# Patient Record
Sex: FEMALE | Race: WHITE | Employment: FULL TIME | URBAN - METROPOLITAN AREA
[De-identification: names, ages, dates, MRNs, and addresses within clinical notes are randomized per-mention and may not be internally consistent; named-entity substitution may affect disease eponyms.]

---

## 2020-02-28 ENCOUNTER — OP HISTORICAL/CONVERTED ENCOUNTER (OUTPATIENT)
Dept: OTHER | Age: 60
End: 2020-02-28

## 2020-06-04 ENCOUNTER — OP HISTORICAL/CONVERTED ENCOUNTER (OUTPATIENT)
Dept: OTHER | Age: 60
End: 2020-06-04

## 2020-09-11 ENCOUNTER — OP HISTORICAL/CONVERTED ENCOUNTER (OUTPATIENT)
Dept: OTHER | Age: 60
End: 2020-09-11

## 2020-10-01 ENCOUNTER — OP HISTORICAL/CONVERTED ENCOUNTER (OUTPATIENT)
Dept: OTHER | Age: 60
End: 2020-10-01

## 2020-10-01 ENCOUNTER — HOSPITAL ENCOUNTER (EMERGENCY)
Age: 60
Discharge: HOME | End: 2020-10-01
Attending: EMERGENCY MEDICINE
Payer: OTHER MISCELLANEOUS

## 2020-10-01 VITALS
WEIGHT: 213 LBS | HEART RATE: 69 BPM | DIASTOLIC BLOOD PRESSURE: 84 MMHG | SYSTOLIC BLOOD PRESSURE: 118 MMHG | BODY MASS INDEX: 36.37 KG/M2 | RESPIRATION RATE: 18 BRPM | OXYGEN SATURATION: 95 % | TEMPERATURE: 97.9 F | HEIGHT: 64 IN

## 2020-10-01 DIAGNOSIS — Z20.9 INFECTIOUS DISEASE EXPOSURE: Primary | ICD-10-CM

## 2020-10-01 LAB
HIV1 P24 AG SERPL QL IA: NONREACTIVE
HIV1+2 AB SERPL QL IA: NONREACTIVE

## 2020-10-01 PROCEDURE — 80074 ACUTE HEPATITIS PANEL: CPT

## 2020-10-01 PROCEDURE — 99282 EMERGENCY DEPT VISIT SF MDM: CPT

## 2020-10-01 PROCEDURE — 87389 HIV-1 AG W/HIV-1&-2 AB AG IA: CPT

## 2020-10-01 PROCEDURE — 36415 COLL VENOUS BLD VENIPUNCTURE: CPT

## 2020-10-01 NOTE — ED PROVIDER NOTES
EMERGENCY DEPARTMENT HISTORY AND PHYSICAL EXAM      Date: 10/1/2020  Patient Name: Butch Cruz    History of Presenting Illness     Chief Complaint   Patient presents with    Body fluid exposure       History Provided By: Patient    HPI: Butch Cruz, 60 y.o. female with a past medical history significant No significant past medical history presents to the ED with cc of body fluid exposure. 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 Patient conveys that she washed her eye out immediately. Eyes any history of infectious disease or HIV. She is conveying that she does not feel like this exposure was significant Bowsher. She specifically denies fever, chills, nausea, vomiting, chest pain, shortness of breath, rash, diarrhea, headache, night sweats, weight loss. There are no other complaints, changes, or physical findings at this time. PCP: Karissa Rojas MD        Past History     Past Medical History:  Past Medical History:   Diagnosis Date    Depression     High cholesterol     HTN (hypertension)        Past Surgical History:  Past Surgical History:   Procedure Laterality Date    HX  SECTION      HX ROTATOR CUFF REPAIR Right        Family History:  History reviewed. No pertinent family history. Social History:  Social History     Tobacco Use    Smoking status: Never Smoker    Smokeless tobacco: Never Used   Substance Use Topics    Alcohol use: Not Currently    Drug use: Never       Allergies:  No Known Allergies      Review of Systems     Review of Systems   Constitutional: Negative. Negative for activity change, appetite change, chills, fatigue, fever and unexpected weight change. HENT: Negative. Negative for congestion, hearing loss, rhinorrhea, sneezing and voice change. Eyes: Negative. Negative for pain and visual disturbance. Respiratory: Negative.   Negative for apnea, cough, choking, chest tightness and shortness of breath. Cardiovascular: Negative. Negative for chest pain and palpitations. Gastrointestinal: Negative. Negative for abdominal distention, abdominal pain, blood in stool, diarrhea, nausea and vomiting. Genitourinary: Negative. Negative for difficulty urinating, flank pain, frequency and urgency. No discharge   Musculoskeletal: Negative. Negative for arthralgias, back pain, myalgias and neck stiffness. Skin: Negative. Negative for color change and rash. Allergic/Immunologic: Negative for immunocompromised state. Neurological: Negative. Negative for dizziness, seizures, syncope, speech difficulty, weakness, numbness and headaches. Hematological: Negative for adenopathy. Psychiatric/Behavioral: Negative. Negative for agitation, behavioral problems, dysphoric mood and suicidal ideas. The patient is not nervous/anxious. Physical Exam     Physical Exam  Vitals signs and nursing note reviewed. Constitutional:       General: She is not in acute distress. Appearance: She is well-developed. HENT:      Head: Normocephalic and atraumatic. Nose: Nose normal.      Mouth/Throat:      Mouth: Mucous membranes are moist.      Pharynx: Oropharynx is clear. No oropharyngeal exudate. Eyes:      General:         Right eye: No discharge. Left eye: No discharge. Conjunctiva/sclera: Conjunctivae normal.      Pupils: Pupils are equal, round, and reactive to light. Neck:      Musculoskeletal: Normal range of motion and neck supple. Cardiovascular:      Rate and Rhythm: Normal rate and regular rhythm. Chest Wall: PMI is not displaced. No thrill. Heart sounds: Normal heart sounds. No murmur. No friction rub. No gallop. Pulmonary:      Effort: Pulmonary effort is normal. No respiratory distress. Breath sounds: Normal breath sounds. No wheezing or rales. Chest:      Chest wall: No tenderness. Abdominal:      General: Bowel sounds are normal. There is no distension. Palpations: Abdomen is soft. There is no mass. Tenderness: There is no abdominal tenderness. There is no guarding or rebound. Musculoskeletal: Normal range of motion. Lymphadenopathy:      Cervical: No cervical adenopathy. Skin:     General: Skin is warm and dry. Capillary Refill: Capillary refill takes less than 2 seconds. Findings: No erythema or rash. Neurological:      Mental Status: She is alert and oriented to person, place, and time. Cranial Nerves: No cranial nerve deficit. Coordination: Coordination normal.   Psychiatric:         Mood and Affect: Mood normal.         Behavior: Behavior normal.         Diagnostic Study Results     Labs -   No results found for this or any previous visit (from the past 12 hour(s)). Radiologic Studies -   [unfilled]  CT Results  (Last 48 hours)    None        CXR Results  (Last 48 hours)    None          Medical Decision Making and ED Course   I am the first provider for this patient. I reviewed the vital signs, available nursing notes, past medical history, past surgical history, family history and social history. Vital Signs-Reviewed the patient's vital signs. Patient Vitals for the past 12 hrs:   Temp Pulse Resp BP SpO2   10/01/20 0655 97.9 °F (36.6 °C) 69 18 118/84 95 %       Records Reviewed: Nursing Notes and Old Medical Records    The patient presents with body fluid exposure with a differential diagnosis of double HIV hepatitis or other infectious disease inoculation. Provider Notes (Medical Decision Making):     MDM  Number of Diagnoses or Management Options  Diagnosis management comments: Patient has body fluid exposure. Will send hepatitis A, B, and C panel as well as HIV 1 and 2P 24 antigen and antibody screen. It is deferring the use of prophylactic antivirals at this point time.        Amount and/or Complexity of Data Reviewed  Clinical lab tests: ordered  Tests in the medicine section of CPT®: ordered  Discussion of test results with the performing providers: no  Decide to obtain previous medical records or to obtain history from someone other than the patient: yes  Obtain history from someone other than the patient: no  Review and summarize past medical records: yes (Exposing patient)  Discuss the patient with other providers: yes    Risk of Complications, Morbidity, and/or Mortality  Presenting problems: moderate  Diagnostic procedures: moderate  Management options: moderate           ED Course:   Initial assessment performed. The patients presenting problems have been discussed, and they are in agreement with the care plan formulated and outlined with them. I have encouraged them to ask questions as they arise throughout their visit. Procedures       Emily Farr MD  Procedures         Disposition       Discharged  Remove if not discharged  DISCHARGE PLAN:  1. There are no discharge medications for this patient. 2.   Follow-up Information     Follow up With Specialties Details Why Contact Info    Shahzad Blackburn MD Family Medicine Call in 2 days As needed, If symptoms worsen [address] [facility]  [phone]          3. Return to ED if worse     Diagnosis     Clinical Impression:   1. Infectious disease exposure        Attestations:    Emily Farr MD    Please note that this dictation was completed with [employer], the computer voice recognition software. Quite often unanticipated grammatical, syntax, homophones, and other interpretive errors are inadvertently transcribed by the computer software. Please disregard these errors. Please excuse any errors that have escaped final proofreading. Thank you.

## 2020-10-04 LAB
HAV AB SER QL IA: NEGATIVE
HAV IGM SERPL QL IA: NEGATIVE
HBV CORE AB SERPL QL IA: NEGATIVE
HBV CORE IGM SERPL QL IA: NEGATIVE
HBV SURFACE AB SER QL: REACTIVE INDEX VALUE
HBV SURFACE AG SERPL QL IA: NEGATIVE
HCV AB S/CO SERPL IA: <0.1 S/CO RATIO

## 2020-10-05 LAB — HCV AB SERPL QL IA: NORMAL

## 2021-09-28 ENCOUNTER — HOSPITAL ENCOUNTER (OUTPATIENT)
Dept: PHYSICAL THERAPY | Age: 61
Discharge: HOME | End: 2021-09-28
Payer: COMMERCIAL

## 2021-09-28 PROCEDURE — 97161 PT EVAL LOW COMPLEX 20 MIN: CPT

## 2021-09-28 PROCEDURE — 97110 THERAPEUTIC EXERCISES: CPT

## 2021-09-28 NOTE — PROGRESS NOTES
PT INITIAL EVALUATION NOTE 2-15    Patient Name: Andrés Bach  Date:2021  : 1960  [x]  Patient  Verified  Payor: MEDICAL [employer] / Plan: [facility] MEDICAL [place] [address] / Product Type: Commerical /    In time:1420  Out time:1505  Total Treatment Time (min): 45  Visit #: 1     Treatment Area: Low back pain [M54.5]    SUBJECTIVE  Pain Level (0-10 scale): 0/10  Any medication changes, allergies to medications, adverse drug reactions, diagnosis change, or new procedure performed?: [] No    [x] Yes (see summary sheet for update)  Subjective:     Pt reports she has been having back pain for 12-18 months. She notes it has significantly intensified over the last 3 months. 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 She reports she has developed intermittent symptoms into her right leg primarily on the lateral aspect and the pain stays above the knee. She reports she spends 12 hour shifts standing on concrete floors but that she really wants to be able to keep working through the pain. She reports burning sensations and pain in the back itself but notes that is primarily when she is lifting or pulling on a patient. She states she is hoping therapy will help her keep working. 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   Past Medical History:  Past Medical History:   Diagnosis Date    Depression  High cholesterol     HTN (hypertension)      Past Surgical History:  Past Surgical History:   Procedure Laterality Date    HX  SECTION      HX ROTATOR CUFF REPAIR Right      Current Medications:  No current outpatient medications       OBJECTIVE/EXAMINATION    OBJECTIVE    Posture:  Head forward rounded shoulders  Gait and Functional Mobility:  antalgic  Palpation: moderate palpatory tenderness over the lumbar paraspinals and distal lats  Sensation: WNL        Lumbar AROM:      Flexion             50 degrees      Extension            45 degrees                     R                    L  Side Bending   50 degrees  50 degrees    Rotation   43 degrees  55 degrees      Manual Muscle Testing  Hip Flexion                   4+/5                  5/5  Knee Extension           5/5                    5/5  Knee Flexion               5/5  5/5  Ankle PF  5/5  5/5  Ankle DF  5/5  5/5    Flexibility: good     Special Tests:   FABERS: neg   Forward Bend: neg    Slump: neg   H.S. SLR: neg     Piriformis Ext: neg   SI Compression/Distraction: neg      10 min Therapeutic Exercise:  [x] See flow sheet :   Rationale: increase ROM and increase strength to improve the patients ability to independently perform lumbar HEP      With   [] TE   [] TA   [] neuro   [] other: Patient Education: [x] Provided HEP for lumbar stretching and core stability   [] Progressed/Changed HEP based on:   [] positioning   [] body mechanics   [] transfers   [] heat/ice application    [] other:        Pain Level (0-10 scale) post treatment: 2/10      ASSESSMENT:    [x]  See Plan of [address] Griffiths PT DPT 2021

## 2021-09-28 NOTE — PROGRESS NOTES
[address]  [place], [place]  Ph: [phone]    Fax: [phone]    Plan of Care/Statement of Necessity for Physical Therapy Services  2-15    Patient name: Mckenna Salinas  : 1960  Provider#: [id number]  Referral source: Argenis Tan MD      Medical/Treatment Diagnosis: Low back pain [M54.5]     Prior Hospitalization: see medical history     Comorbidities: see medical history  Prior Level of Function: Independent with all ADL's; no use of AD  Medications: Verified on Patient Summary List    Start of Care: 2021      Onset Date: 2021       The Plan of Care and following information is based on the information from the initial evaluation. Assessment/ key information: Pt presents to outpatient physical therapy with worsening onset of low back pain. She has dealt with this issue prior for 12-18 months but notes it really began to worsen in the last 3 months. 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 Her current deficits include decreased ROM, decreased strength, impaired gait, and increased pain and tenderness to palpation. She will likely benefit from skilled physical therapy services in order to address these deficits so that she can return to at or near her PLOF.     Evaluation Complexity History LOW Complexity : Zero comorbidities / personal factors that will impact the outcome / POC; Examination LOW Complexity : 1-2 Standardized tests and measures addressing body structure, function, activity limitation and / or participation in recreation  ;Presentation LOW Complexity : Stable, uncomplicated  ;Clinical Decision Making TUG Score: 10 seconds  Overall Complexity Rating: LOW     Problem List: pain affecting function, decrease ROM, decrease strength, impaired gait/ balance and decrease activity tolerance   Treatment Plan may include any combination of the following: Therapeutic exercise, Therapeutic activities, Neuromuscular re-education, Physical agent/modality, Gait/balance training, Manual therapy and Patient education  Patient / Family readiness to learn indicated by: asking questions and interest  Persons(s) to be included in education: patient (P)  Barriers to Learning/Limitations: None  Patient Goal (s): to be able to work without pain  Patient Self Reported Health Status: fair  Rehabilitation Potential: good    Short Term Goals: To be accomplished in 6 treatments. 1)  Pt to be independent with HEP  2)  Pt to improve lumbar flexion by 5 degrees in order to demonstrate improved facet joint function and no pain with walking beyond 3/10    Long Term Goals: To be accomplished in 12  treatments. 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 Patient/ Caregiver education and instruction: activity modification and exercises    [x]  Plan of care has been reviewed with GWENDOLYN De Paz, PT DPT 2021     ________________________________________________________________________    I certify that the above Therapy Services are being furnished while the patient is under my care. I agree with the treatment plan and certify that this therapy is necessary.     Physician's Signature:____________________  Date:____________Time: _________    Patient name: Sunny Lopez  : 1960  Provider#: [id number]

## 2021-10-05 ENCOUNTER — HOSPITAL ENCOUNTER (OUTPATIENT)
Dept: PHYSICAL THERAPY | Age: 61
Discharge: HOME | End: 2021-10-05
Payer: COMMERCIAL

## 2021-10-05 PROCEDURE — 97014 ELECTRIC STIMULATION THERAPY: CPT

## 2021-10-05 PROCEDURE — 97110 THERAPEUTIC EXERCISES: CPT

## 2021-10-05 NOTE — PROGRESS NOTES
PT DAILY TREATMENT NOTE 2-15    Patient Name: Lowell Rojas  Date:10/5/2021  : 1960  [x]  Patient  Verified  Payor: MEDICAL [employer] [place] / Plan: [facility] MEDICAL [place] [address] [facility] / Product Type: Commerical /    In time: 8:00 AM  Out time:8:57 PM  Total Treatment Time (min):   Visit #:  2    Treatment Area: Low back pain [M54.5]    SUBJECTIVE  Pain Level (0-10 scale): 1/10  Any medication changes, allergies to medications, adverse drug reactions, diagnosis change, or new procedure performed?: [x] No    [] Yes (see summary sheet for update)  Subjective functional status/changes:   [] No changes reported    I did my exercises and it helped. This morning I have more stiffness.     OBJECTIVE      Modality rationale: decrease inflammation, decrease pain and increase tissue extensibility to improve the patients ability to improve lumbar flexion by 5 degrees in order to demonstrate improved facet joint function and no pain with walking beyond 3/10      Min Type Additional Details      15 [x] Estim: []Att   [x]Unatt    []TENS instruct                  [x]IFC  []Premod   []NMES                    []Other:  []w/US      [x]w/ heat  []w/ ice  Position: lumbar  Location:sitting       []  Traction: [] Cervical       []Lumbar                       [] Prone          []Supine                       []Intermittent   []Continuous Lbs:  [] before manual  [] after manual  [] w/ heat  [] Simultaneously performed with w/ Estim    []  Ultrasound: []Continuous   [] Pulsed                       at: []1MHz   []3MHz Location:  W/cm2:    [] Paraffin         Location:   []w/heat    []  Ice     []  Heat  []  Ice massage Position:  Location:    []  Laser  []  Other: Position:  Location:      []  Vasopneumatic Device Pressure:       [] lo [] med [] hi   [] w/ ice      Temperature:   [] Simultaneously performed with w/ Estim     [x] Skin assessment post-treatment:  [x]intact []redness- no adverse reaction    []redness - adverse reaction:       35 min Therapeutic Exercise:  [x] See flow sheet :   Rationale: increase ROM and increase strength to improve the patients ability to improve lumbar flexion by 5 degrees in order to demonstrate improved facet joint function and no pain with walking beyond 3/10            With   [] TE   [] TA   [] neuro   [] other: Patient Education: [x] Review HEP    [] Progressed/Changed HEP based on:   [] positioning   [] body mechanics   [] transfers   [] heat/ice application    [] other:      Other Objective/Functional Measures: Lumbar strengthening and stretching exercise  Followed by modalities to Low back     Pain Level (0-10 scale) post treatment: 0/10    ASSESSMENT/Changes in Function:   Patient tolerated treatment with lumbar exercises strengthening exercises. Patient will benefit from postural exercises of the lower and upper back to improve  excess pressure on facet joints. Patient will continue to benefit from skilled PT services to address ROM deficits, address strength deficits and analyze and address soft tissue restrictions to attain remaining goals. [x]  See Plan of Care  []  See progress note/recertification  []  See Discharge Summary         Progress towards goals / Updated goals:  Short Term Goals: To be accomplished in 6 treatments. 1)  Pt to be independent with HEP  2)  Pt to improve lumbar flexion by 5 degrees in order to demonstrate improved facet joint function and no pain with walking beyond 3/10     Long Term Goals: To be accomplished in 12  treatments.   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

## 2021-10-07 ENCOUNTER — APPOINTMENT (OUTPATIENT)
Dept: PHYSICAL THERAPY | Age: 61
End: 2021-10-07
Payer: COMMERCIAL

## 2021-10-11 ENCOUNTER — HOSPITAL ENCOUNTER (OUTPATIENT)
Dept: PHYSICAL THERAPY | Age: 61
Discharge: HOME | End: 2021-10-11
Payer: COMMERCIAL

## 2021-10-11 PROCEDURE — 97014 ELECTRIC STIMULATION THERAPY: CPT

## 2021-10-11 PROCEDURE — 97110 THERAPEUTIC EXERCISES: CPT

## 2021-10-11 NOTE — PROGRESS NOTES
PT DAILY TREATMENT NOTE 2-15    Patient Name: Lowell Rojas  Date:10/11/2021  : 1960  [x]  Patient  Verified  Payor: MEDICAL [facility] / Plan: [facility] MEDICAL [place] [address] [facility] / Product Type: Commerical /    In time: 2:48  Out time: 3:54  Total Treatment Time (min): 66  Visit #:  3    Treatment Area: Low back pain [M54.5]    SUBJECTIVE  Pain Level (0-10 scale): 10  Any medication changes, allergies to medications, adverse drug reactions, diagnosis change, or new procedure performed?: [x] No    [] Yes (see summary sheet for update)  Subjective functional status/changes:   [] No changes reported  Pt reports most of her pain today is along side of R leg    OBJECTIVE    Modality rationale: decrease pain and increase tissue extensibility to improve the patients ability to be able to perform AROM   Min Type Additional Details      10 [x] Estim: []Att   [x]Unatt    []TENS instruct                  [x]IFC  []Premod   []NMES                    []Other:  []w/US      [x]w/ heat  []w/ ice  Position: seated  Location: low back       []  Traction: [] Cervical       []Lumbar                       [] Prone          []Supine                       []Intermittent   []Continuous Lbs:  [] before manual  [] after manual  [] w/ heat  [] Simultaneously performed with w/ Estim    []  Ultrasound: []Continuous   [] Pulsed                       at: []1MHz   []3MHz Location:  W/cm2:    [] Paraffin         Location:   []w/heat    []  Ice     []  Heat  []  Ice massage Position:  Location:    []  Laser  []  Other: Position:  Location:      []  Vasopneumatic Device Pressure:       [] lo [] med [] hi   [] w/ ice      Temperature:   [] Simultaneously performed with w/ Estim     [x] Skin assessment post-treatment:  [x]intact [x]redness- no adverse reaction    []redness - adverse reaction:       56 min Therapeutic Exercise:  [x] See flow sheet :   Rationale: increase ROM and increase strength to improve the patients ability to improve functional mobility         With   [x] TE   [] TA   [] neuro   [] other: Patient Education: [x] Review HEP    [] Progressed/Changed HEP based on:   [] positioning   [] body mechanics   [] transfers   [] heat/ice application    [] other:      Pain Level (0-10 scale) post treatment: 2/10    ASSESSMENT/Changes in Function: Patient tolerated treatment fairly well. Pt noted increased tightness in R hip > L with stretches. Patient will continue to benefit from skilled PT services to address functional mobility deficits, address ROM deficits and address strength deficits to attain remaining goals. [x]  See Plan of Care  []  See progress note/recertification  []  See Discharge Summary         Progress towards goals / Updated goals:  Short Term Goals: To be accomplished in 6 treatments. 1)  Pt to be independent with HEP  2)  Pt to improve lumbar flexion by 5 degrees in order to demonstrate improved facet joint function and no pain with walking beyond 3/10     Long Term Goals: To be accomplished in 12  treatments.   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

## 2021-10-14 ENCOUNTER — APPOINTMENT (OUTPATIENT)
Dept: PHYSICAL THERAPY | Age: 61
End: 2021-10-14
Payer: COMMERCIAL

## 2021-10-21 ENCOUNTER — HOSPITAL ENCOUNTER (OUTPATIENT)
Dept: PHYSICAL THERAPY | Age: 61
Discharge: HOME | End: 2021-10-21
Payer: COMMERCIAL

## 2021-10-21 PROCEDURE — 97014 ELECTRIC STIMULATION THERAPY: CPT

## 2021-10-21 PROCEDURE — 97110 THERAPEUTIC EXERCISES: CPT

## 2021-10-21 NOTE — PROGRESS NOTES
PT DAILY TREATMENT NOTE 2-15    Patient Name: Zita Alonso  Date:10/21/2021  : 1960  [x]  Patient  Verified  Payor: [facility] / Plan: [facility] MEDICAL [address] / Product Type: Commerical /    In time: 3:04  Out time: 4:03  Total Treatment Time (min): 61  Visit #:  4    Treatment Area: Low back pain [M54.5]    SUBJECTIVE  Pain Level (0-10 scale): 2/10  Any medication changes, allergies to medications, adverse drug reactions, diagnosis change, or new procedure performed?: [x] No    [] Yes (see summary sheet for update)  Subjective functional status/changes:   [] No changes reported  Pt states she just has soreness in her hip, but present with increased limp    OBJECTIVE    Modality rationale: decrease pain and increase tissue extensibility to improve the patients ability to be able to perform AROM   Min Type Additional Details      10 [x] Estim: []Att   [x]Unatt    []TENS instruct                  [x]IFC  []Premod   []NMES                    []Other:  []w/US      [x]w/ heat  []w/ ice  Position: seated  Location: low back       []  Traction: [] Cervical       []Lumbar                       [] Prone          []Supine                       []Intermittent   []Continuous Lbs:  [] before manual  [] after manual  [] w/ heat  [] Simultaneously performed with w/ Estim    []  Ultrasound: []Continuous   [] Pulsed                       at: []1MHz   []3MHz Location:  W/cm2:    [] Paraffin         Location:   []w/heat    []  Ice     []  Heat  []  Ice massage Position:  Location:    []  Laser  []  Other: Position:  Location:      []  Vasopneumatic Device Pressure:       [] lo [] med [] hi   [] w/ ice      Temperature:   [] Simultaneously performed with w/ Estim     [x] Skin assessment post-treatment:  [x]intact [x]redness- no adverse reaction    []redness - adverse reaction:       49 min Therapeutic Exercise:  [x] See flow sheet :   Rationale: increase ROM and increase strength to improve the patients ability to improve functional mobility         With   [x] TE   [] TA   [] neuro   [] other: Patient Education: [x] Review HEP    [] Progressed/Changed HEP based on:   [] positioning   [] body mechanics   [] transfers   [] heat/ice application    [] other:      Pain Level (0-10 scale) post treatment: 2/10    ASSESSMENT/Changes in Function: Patient tolerated treatment fairly well. Post-tx pt stated her back was fine, but still c/o pain in her hip. Pt requires cues for all stretches/exercises for correct form. Patient will continue to benefit from skilled PT services to address functional mobility deficits, address ROM deficits and address strength deficits to attain remaining goals. [x]  See Plan of Care  []  See progress note/recertification  []  See Discharge Summary         Progress towards goals / Updated goals:  Short Term Goals: To be accomplished in 6 treatments. 1)  Pt to be independent with HEP  2)  Pt to improve lumbar flexion by 5 degrees in order to demonstrate improved facet joint function and no pain with walking beyond 3/10     Long Term Goals: To be accomplished in 12  treatments.   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

## 2021-10-27 ENCOUNTER — HOSPITAL ENCOUNTER (OUTPATIENT)
Dept: PHYSICAL THERAPY | Age: 61
Discharge: HOME | End: 2021-10-27
Payer: COMMERCIAL

## 2021-10-27 PROCEDURE — 97014 ELECTRIC STIMULATION THERAPY: CPT

## 2021-10-27 PROCEDURE — 97110 THERAPEUTIC EXERCISES: CPT

## 2021-10-27 NOTE — PROGRESS NOTES
PT DAILY TREATMENT NOTE 2-15    Patient Name: Omi Gates  Date:10/27/2021  : 1960  [x]  Patient  Verified  Payor: [id number] / Plan: [facility] MEDICAL [address] / Product Type: Commerical /    In time:301 pm   Out time:400 pm  Total Treatment Time (min): 61  Visit #:  5    Treatment Area: Low back pain [M54.5]    SUBJECTIVE  Pain Level (0-10 scale): 1-2/10  Any medication changes, allergies to medications, adverse drug reactions, diagnosis change, or new procedure performed?: [x] No    [] Yes (see summary sheet for update)  Subjective functional status/changes:   [] No changes reported  \"Pt reports some soreness in hip and only a little discomfort in her back. \"    OBJECTIVE      Modality rationale: decrease inflammation, decrease pain and increase tissue extensibility to improve the patients ability to increase back and hip motion    Min Type Additional Details      10 [x] Estim: []Att   [x]Unatt    []TENS instruct                  [x]IFC  []Premod   []NMES                    []Other:  []w/US      [x]w/ heat  []w/ ice  Position: seated  Location: R hip and LB       []  Traction: [] Cervical       []Lumbar                       [] Prone          []Supine                       []Intermittent   []Continuous Lbs:  [] before manual  [] after manual  [] w/ heat  [] Simultaneously performed with w/ Estim    []  Ultrasound: []Continuous   [] Pulsed                       at: []1MHz   []3MHz Location:  W/cm2:    [] Paraffin         Location:   []w/heat    []  Ice     []  Heat  []  Ice massage Position:  Location:    []  Laser  []  Other: Position:  Location:      []  Vasopneumatic Device Pressure:       [] lo [] med [] hi   [] w/ ice      Temperature:   [] Simultaneously performed with w/ Estim     [x] Skin assessment post-treatment:  [x]intact []redness- no adverse reaction    []redness - adverse reaction:       49 min Therapeutic Exercise:  [x] See flow sheet :   Rationale: increase ROM, increase strength and improve coordination to improve the patients ability to increase back and hip motion with decrease c/o. With   [] TE   [] TA   [] neuro   [] other: Patient Education: [x] Review HEP    [] Progressed/Changed HEP based on:   [] positioning   [] body mechanics   [] transfers   [] heat/ice application    [] other:           Pain Level (0-10 scale) post treatment: 0/10    ASSESSMENT/Changes in Function:   Patient tolerated treatment session with work on flex /ext stretching in low back with cat and camel ex pt very stiff and having trouble with transition between the two. Pt did get relief with estim and MHP. Pt given HEP handout for roberth pose and cat-camel to add to ex program.   Patient will continue to benefit from skilled PT services to modify and progress therapeutic interventions, address functional mobility deficits, address ROM deficits, address strength deficits and analyze and address soft tissue restrictions to attain remaining goals. [x]  See Plan of Care  []  See progress note/recertification  []  See Discharge Summary         Progress towards goals / Updated goals:  Short Term Goals: To be accomplished in 6 treatments. 1)  Pt to be independent with HEP  2)  Pt to improve lumbar flexion by 5 degrees in order to demonstrate improved facet joint function and no pain with walking beyond 3/10     Long Term Goals: To be accomplished in 12  treatments. 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 Lara Bloch, Rometta Brock 10/27/2021

## 2021-10-28 ENCOUNTER — HOSPITAL ENCOUNTER (OUTPATIENT)
Dept: PHYSICAL THERAPY | Age: 61
Discharge: HOME | End: 2021-10-28
Payer: COMMERCIAL

## 2021-10-28 PROCEDURE — 97014 ELECTRIC STIMULATION THERAPY: CPT

## 2021-10-28 PROCEDURE — 97530 THERAPEUTIC ACTIVITIES: CPT

## 2021-10-28 PROCEDURE — 97110 THERAPEUTIC EXERCISES: CPT

## 2021-10-28 NOTE — PROGRESS NOTES
[address] [facility], [address]  Ph: [phone]    Fax: [phone]    Progress Note    Name: Indigo Ortiz   : 1960   MD: Iggy Frank MD       Treatment Diagnosis: Low back pain [M54.5]  Start of Care: 2021    Visits from Start of Care: 6     Missed Visits: 1    Summary of Care / Assessment / Recommendations: The Plan of Care and following information is based on the information from the initial evaluation. Assessment/ key information: Pt presents to outpatient physical therapy with worsening onset of low back pain. She has dealt with this issue prior for 12-18 months but notes it really began to worsen in the last 3 months. 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 Patient continues to work long hours. However, pain has decreased and range of motion of the lumbar spine has increased in lateral flexion. She continues to have a deficits include decreased ROM, decreased strength, impaired gait, and increased pain and tenderness to palpation. She will likely benefit from continued skilled physical therapy services in order to address these deficits so that she can return to at or near her PLOF. Progress towards goals / Updated goals:  Short Term Goals: To be accomplished in 6 treatments. 1)  Pt to be independent with HEP - met   2)  Pt to improve lumbar flexion by 5 degrees in order to demonstrate improved facet joint function and no pain with walking beyond 3/10     Long Term Goals: To be accomplished in 12  treatments.   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 Retain this original for your records. If you are unable to process this request in 24 hours, please contact our office.        ____ I have read the above report and request that my patient continue therapy with the following changes/special instructions:  ____ I have read the above report and request that my patient be discharged from therapy    Physician's Signature:_________________ Date:___________Time:__________

## 2021-10-28 NOTE — PROGRESS NOTES
PT DAILY TREATMENT NOTE 2-15    Patient Name: Sunny Lopez  Date:10/28/2021  : 1960  [x]  Patient  Verified  Payor: [id number] / Plan: [facility] MEDICAL [address] / Product Type: Commerical /    In time:303 pm   Out time:400 pm   Total Treatment Time (min): 62  Visit #:  6    Treatment Area: Low back pain [M54.5]    SUBJECTIVE  Pain Level (0-10 scale): 0/10  Any medication changes, allergies to medications, adverse drug reactions, diagnosis change, or new procedure performed?: [x] No    [] Yes (see summary sheet for update)  Subjective functional status/changes:   [] No changes reported  \"Pt reports no pain just tired. \"    OBJECTIVE      Modality rationale: decrease inflammation, decrease pain and increase tissue extensibility to improve the patients ability to increase back motion    Min Type Additional Details      15 [x] Estim: []Att   [x]Unatt    []TENS instruct                  []IFC  [x]Premod   []NMES                    []Other:  []w/US      [x]w/ heat  []w/ ice  Position: seated   Location: bilat low back        []  Traction: [] Cervical       []Lumbar                       [] Prone          []Supine                       []Intermittent   []Continuous Lbs:  [] before manual  [] after manual  [] w/ heat  [] Simultaneously performed with w/ Estim    []  Ultrasound: []Continuous   [] Pulsed                       at: []1MHz   []3MHz Location:  W/cm2:    [] Paraffin         Location:   []w/heat    []  Ice     []  Heat  []  Ice massage Position:  Location:    []  Laser  []  Other: Position:  Location:      []  Vasopneumatic Device Pressure:       [] lo [] med [] hi   [] w/ ice      Temperature:   [] Simultaneously performed with w/ Estim     [x] Skin assessment post-treatment:  [x]intact []redness- no adverse reaction    []redness - adverse reaction:       15 min Therapeutic Exercise:  [x] See flow sheet :   Rationale: increase ROM, increase strength and improve coordination to improve the patients ability to increase back motion with decreased c/o.     27 min Therapeutic Activity:  [x]  See flow sheet :   Rationale: increase ROM, increase strength and improve coordination  to improve the patients ability to review pt progress with ROM , Strength and goals to advance services. With   [] TE   [] TA   [] neuro   [] other: Patient Education: [x] Review HEP    [] Progressed/Changed HEP based on:   [] positioning   [] body mechanics   [] transfers   [] heat/ice application    [] other:      Other Objective/Functional Measures: Posture: mild  Head forward rounded shoulders  Gait and Functional Mobility:  amb with slight lateral lean to R   Palpation: no tenderness  Sensation:  WNL                                                     Lumbar AROM:                                               Flexion                                              60 degrees                                               Extension                                         45 degrees                                                                                                      R                                 L  Side Bending                           30 degrees                  30 degrees                    Rotation                                   43 degrees                  55 degrees                       Manual Muscle Testing  Hip Flexion                   4+/5                  5/5  Knee Extension           5/5                    5/5  Knee Flexion               5/5                   5/5  Ankle PF                     5/5                   5/5  Ankle DF                     5/5                   5/5     Flexibility: good                Special Tests:              FABERS: neg              Forward Bend: neg                                         Slump: neg              H.S. SLR: neg                                                 Piriformis Ext: neg              SI Compression/Distraction: neg     Pain Level (0-10 scale) post treatment: 0/10    ASSESSMENT/Changes in Function:   Patient tolerated treatment *session with stretching and review of goals, ROM and MMT to show improvement and areas that are still in need of improvement. Discussed with pt and Physical therapist plan to continue qw. Patient will continue to benefit from skilled PT services to modify and progress therapeutic interventions, address functional mobility deficits, address ROM deficits, address strength deficits and analyze and address soft tissue restrictions to attain remaining goals. [x]  See Plan of Care  []  See progress note/recertification  []  See Discharge Summary         Progress towards goals / Updated goals:  Short Term Goals: To be accomplished in 6 treatments. 1)  Pt to be independent with HEP - met   2)  Pt to improve lumbar flexion by 5 degrees in order to demonstrate improved facet joint function and no pain with walking beyond 3/10     Long Term Goals: To be accomplished in 12  treatments. 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

## 2021-11-03 ENCOUNTER — HOSPITAL ENCOUNTER (OUTPATIENT)
Dept: PHYSICAL THERAPY | Age: 61
Discharge: HOME | End: 2021-11-03
Payer: COMMERCIAL

## 2021-11-03 PROCEDURE — 97110 THERAPEUTIC EXERCISES: CPT

## 2021-11-03 PROCEDURE — 97014 ELECTRIC STIMULATION THERAPY: CPT

## 2021-11-03 NOTE — PROGRESS NOTES
PT DAILY TREATMENT NOTE 2-15    Patient Name: Enrike Or  Date:11/3/2021  : 1960  [x]  Patient  Verified  Payor: [employer] [employer] / Plan: [facility] MEDICAL [address] [facility] / Product Type: Commerical /    In time: 2:58  Out time: 3:59  Total Treatment Time (min): 61  Visit #:  7    Treatment Area: Low back pain [M54.5]    SUBJECTIVE  Pain Level (0-10 scale): 1/10  Any medication changes, allergies to medications, adverse drug reactions, diagnosis change, or new procedure performed?: [x] No    [] Yes (see summary sheet for update)  Subjective functional status/changes:   [] No changes reported  \"You get used to it. \"    OBJECTIVE    Modality rationale: decrease pain and increase tissue extensibility to improve the patients ability to be able to perform AROM   Min Type Additional Details      10 [x] Estim: []Att   [x]Unatt    []TENS instruct                  [x]IFC  []Premod   []NMES                    []Other:  []w/US      [x]w/ heat  []w/ ice  Position: seated  Location: low back       []  Traction: [] Cervical       []Lumbar                       [] Prone          []Supine                       []Intermittent   []Continuous Lbs:  [] before manual  [] after manual  [] w/ heat  [] Simultaneously performed with w/ Estim    []  Ultrasound: []Continuous   [] Pulsed                       at: []1MHz   []3MHz Location:  W/cm2:    [] Paraffin         Location:   []w/heat    []  Ice     []  Heat  []  Ice massage Position:  Location:    []  Laser  []  Other: Position:  Location:      []  Vasopneumatic Device Pressure:       [] lo [] med [] hi   [] w/ ice      Temperature:   [] Simultaneously performed with w/ Estim     [x] Skin assessment post-treatment:  [x]intact [x]redness- no adverse reaction    []redness - adverse reaction:       51 min Therapeutic Exercise:  [x] See flow sheet :   Rationale: increase ROM and increase strength to improve the patients ability to improve functional mobility         With   [x] TE   [] TA   [] neuro   [] other: Patient Education: [x] Review HEP    [] Progressed/Changed HEP based on:   [] positioning   [] body mechanics   [] transfers   [] heat/ice application    [] other:      Pain Level (0-10 scale) post treatment: 0/10    ASSESSMENT/Changes in Function: Patient tolerated treatment fairly well. No c/o increased pain. Patient will continue to benefit from skilled PT services to address functional mobility deficits, address ROM deficits and address strength deficits to attain remaining goals. [x]  See Plan of Care  []  See progress note/recertification  []  See Discharge Summary         Progress towards goals / Updated goals:  Short Term Goals: To be accomplished in 6 treatments. 1)  Pt to be independent with HEP - met   2)  Pt to improve lumbar flexion by 5 degrees in order to demonstrate improved facet joint function and no pain with walking beyond 3/10     Long Term Goals: To be accomplished in 12  treatments.   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

## 2021-11-11 ENCOUNTER — HOSPITAL ENCOUNTER (OUTPATIENT)
Dept: PHYSICAL THERAPY | Age: 61
Discharge: HOME | End: 2021-11-11
Payer: COMMERCIAL

## 2021-11-11 PROCEDURE — 97014 ELECTRIC STIMULATION THERAPY: CPT

## 2021-11-11 PROCEDURE — 97110 THERAPEUTIC EXERCISES: CPT

## 2021-11-11 NOTE — PROGRESS NOTES
PT DAILY TREATMENT NOTE 2-15    Patient Name: Andrés Bach  Date:2021  : 1960  [x]  Patient  Verified  Payor: [id number] / Plan: [facility] MEDICAL [address] / Product Type: Commerical /    In time:3:00 PM  Out time:3:45 PM  Total Treatment Time (min): 45  Visit #:  8    Treatment Area: Low back pain [M54.5]    SUBJECTIVE  Pain Level (0-10 scale): 0/10  Any medication changes, allergies to medications, adverse drug reactions, diagnosis change, or new procedure performed?: [x] No    [] Yes (see summary sheet for update)  Subjective functional status/changes:   [] No changes reported    I worked long shift last night. I have mostly soreness in my hips. 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    Min Type Additional Details      15 [x] Estim: []Att   [x]Unatt    []TENS instruct                  [x]IFC  []Premod   []NMES                    []Other:  []w/US      [x]w/ heat  []w/ ice  Position:sitting  Location: Low back       []  Traction: [] Cervical       []Lumbar                       [] Prone          []Supine                       []Intermittent   []Continuous Lbs:  [] before manual  [] after manual  [] w/ heat  [] Simultaneously performed with w/ Estim    []  Ultrasound: []Continuous   [] Pulsed                       at: []1MHz   []3MHz Location:  W/cm2:    [] Paraffin         Location:   []w/heat    []  Ice     []  Heat  []  Ice massage Position:  Location:    []  Laser  []  Other: Position:  Location:      []  Vasopneumatic Device Pressure:       [] lo [] med [] hi   [] w/ ice      Temperature:   [] Simultaneously performed with w/ Estim     [x] Skin assessment post-treatment:  [x]intact []redness- no adverse reaction    []redness - adverse reaction:       30 min Therapeutic Exercise:  [x] See flow sheet :   Rationale: increase ROM and increase strength to improve the patients ability to demonstrate proper technique 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 -              With   [] TE   [] TA   [] neuro   [] other: Patient Education: [x] Review HEP    [] Progressed/Changed HEP based on:   [] positioning   [] body mechanics   [] transfers   [] heat/ice application    [] other:          Pain Level (0-10 scale) post treatment: 0/10    ASSESSMENT/Changes in Function:   Patient tolerated treatment for strengthening Low back. Patient is not having low back pain. Complaint more of  hip soreness. Patient may need  to let her back recover from working excessive long hours. Patient will continue to benefit from skilled PT services to address ROM deficits and address strength deficits to attain remaining goals. [x]  See Plan of Care  []  See progress note/recertification  []  See Discharge Summary           Progress towards goals / Updated goals:  Short Term Goals: To be accomplished in 6 treatments. 1)  Pt to be independent with HEP - MET   2)  Pt to improve lumbar flexion by 5 degrees in order to demonstrate improved facet joint function and no pain with walking beyond 3/10. MET     Long Term Goals: To be accomplished in 12  treatments.   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

## 2021-11-17 ENCOUNTER — APPOINTMENT (OUTPATIENT)
Dept: PHYSICAL THERAPY | Age: 61
End: 2021-11-17
Payer: COMMERCIAL

## 2021-11-22 ENCOUNTER — HOSPITAL ENCOUNTER (OUTPATIENT)
Dept: PHYSICAL THERAPY | Age: 61
Discharge: HOME | End: 2021-11-22
Payer: COMMERCIAL

## 2021-11-22 PROCEDURE — 97530 THERAPEUTIC ACTIVITIES: CPT

## 2021-11-22 PROCEDURE — 97110 THERAPEUTIC EXERCISES: CPT

## 2021-11-22 NOTE — PROGRESS NOTES
PT DAILY TREATMENT NOTE 2-15    Patient Name: Blanca Carrillo  Date:2021  : 1960  [x]  Patient  Verified  Payor: MEDICAL [employer] / Plan: [facility] [address] / Product Type: Commerical /    In time:300 pm   Out time:336 pm   Total Treatment Time (min): 36  Visit #:  9    Treatment Area: Low back pain [M54.5]    SUBJECTIVE  Pain Level (0-10 scale): 0/10  Any medication changes, allergies to medications, adverse drug reactions, diagnosis change, or new procedure performed?: [x] No    [] Yes (see summary sheet for update)  Subjective functional status/changes:   [] No changes reported  \"Pt reports doing better the ex has really helped. \"    OBJECTIVE      12 min Therapeutic Exercise:  [x] See flow sheet :   Rationale: increase ROM, increase strength and improve coordination to improve the patients ability to increase functional back motion     24 min Therapeutic Activity:  [x]  See flow sheet :   Rationale: increase ROM, increase strength and improve coordination  to improve the patients ability to preform ex, met goals and increase motion and strength for back motion                With   [] TE   [] TA   [] neuro   [] other: Patient Education: [x] Review HEP    [] Progressed/Changed HEP based on:   [] positioning   [] body mechanics   [] transfers   [] heat/ice application    [] other:      Other Objective/Functional Measures: Posture: slight Head forward rounded shoulders  Gait and Functional Mobility:  amb with equal WS and trunk swing  Palpation: no tenderness  Sensation: WNL     [place] [employer]:                                               Flexion                                              70 degrees                                               Extension                                         45 degrees                                                                                                      R                                 L  Side Bending                           40 degrees                  45 degrees                    Rotation                                   43 degrees                  55 degrees                       Manual Muscle Testing  Hip Flexion                   5/5                  5/5  Knee Extension           5/5                    5/5  Knee Flexion               5/5                   5/5  Ankle PF                     5/5                   5/5  Ankle DF                     5/5                   5/5     Flexibility: good                Special Tests:              HQJDKF: neg              SZOGGMF Bend: neg                                         Slump: neg              H.S. SLR: neg                                                 Piriformis Ext: neg              GH Compression/Distraction: neg             Pain Level (0-10 scale) post treatment: 0/10    ASSESSMENT/Changes in Function:   Patient tolerated treatment session with some ex and review of goals, ROM and MMT. Pt exhibts equal and normal motion with all activities. Pt notes compliance with HEP, given updated copy for continued use and red thera band to progress ex. Pt. Discussed with DPT discharge from therapy services a this time. Patient did benefit from skilled PT services to modify and progress therapeutic interventions, address functional mobility deficits, address ROM deficits, address strength deficits and analyze and address soft tissue restrictions to attain remaining goals.      []  See Plan of Care  []  See progress note/recertification  [x]  See Discharge Summary         Progress towards goals / Updated goals:  Short Term Goals: To be accomplished in 6 treatments. 1)  Pt to be independent with HEP - MET   2)  Pt to improve lumbar flexion by 5 degrees in order to demonstrate improved facet joint function and no pain with walking beyond 3/10. MET     Long Term Goals: To be accomplished in 12  treatments.   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

## 2022-02-07 ENCOUNTER — APPOINTMENT (OUTPATIENT)
Dept: GENERAL RADIOLOGY | Age: 62
End: 2022-02-07
Attending: NURSE PRACTITIONER
Payer: OTHER MISCELLANEOUS

## 2022-02-07 ENCOUNTER — NURSE TRIAGE (OUTPATIENT)
Dept: OTHER | Facility: CLINIC | Age: 62
End: 2022-02-07

## 2022-02-07 ENCOUNTER — HOSPITAL ENCOUNTER (EMERGENCY)
Age: 62
Discharge: HOME | End: 2022-02-07
Payer: OTHER MISCELLANEOUS

## 2022-02-07 VITALS
BODY MASS INDEX: 35 KG/M2 | RESPIRATION RATE: 20 BRPM | DIASTOLIC BLOOD PRESSURE: 80 MMHG | SYSTOLIC BLOOD PRESSURE: 156 MMHG | TEMPERATURE: 97.9 F | HEIGHT: 64 IN | HEART RATE: 66 BPM | WEIGHT: 205 LBS | OXYGEN SATURATION: 96 %

## 2022-02-07 DIAGNOSIS — Z02.6 ENCOUNTER RELATED TO WORKER'S COMPENSATION CLAIM: ICD-10-CM
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
## 2022-02-07 DIAGNOSIS — M25.531 RIGHT WRIST PAIN: ICD-10-CM

## 2022-02-07 DIAGNOSIS — M25.561 PAIN IN BOTH KNEES, UNSPECIFIED CHRONICITY: ICD-10-CM

## 2022-02-07 DIAGNOSIS — M25.511 RIGHT SHOULDER PAIN, UNSPECIFIED CHRONICITY: ICD-10-CM

## 2022-02-07 DIAGNOSIS — M25.562 PAIN IN BOTH KNEES, UNSPECIFIED CHRONICITY: ICD-10-CM

## 2022-02-07 PROCEDURE — 73110 X-RAY EXAM OF WRIST: CPT

## 2022-02-07 PROCEDURE — 73562 X-RAY EXAM OF KNEE 3: CPT

## 2022-02-07 PROCEDURE — 74011250637 HC RX REV CODE- 250/637: Performed by: NURSE PRACTITIONER

## 2022-02-07 PROCEDURE — 73030 X-RAY EXAM OF SHOULDER: CPT

## 2022-02-07 PROCEDURE — 99283 EMERGENCY DEPT VISIT LOW MDM: CPT

## 2022-02-07 PROCEDURE — 73080 X-RAY EXAM OF ELBOW: CPT

## 2022-02-07 RX ORDER — KETOROLAC TROMETHAMINE 10 MG/1
10 TABLET, FILM COATED ORAL ONCE
Status: COMPLETED | OUTPATIENT
Start: 2022-02-07 | End: 2022-02-07

## 2022-02-07 RX ORDER — KETOROLAC TROMETHAMINE 10 MG/1
10 TABLET, FILM COATED ORAL
Qty: 12 TABLET | Refills: 0 | Status: SHIPPED | OUTPATIENT
Start: 2022-02-07 | End: 2022-02-10

## 2022-02-07 RX ADMIN — KETOROLAC TROMETHAMINE 10 MG: 10 TABLET, FILM COATED ORAL at 22:25

## 2022-02-07 NOTE — Clinical Note
[facility] EMERGENCY DEPT  [address] [address]  [phone]    Work/School Note    Date: 2/7/2022    To Whom It May concern:      Fazal Dao was seen and treated today in the emergency room by the following provider(s):  Nurse Practitioner: Xena Talley NP. Fazal Dao is excused from work/school on 02/07/22. She is clear to return to work/school on 02/08/22. Adriana Whittkshire is unable to return and finish her shift tonight 2/7/2022 but may return 2/8/2022 as she is able and once she follows with [facility].     Sincerely,          Kerri Patel NP

## 2022-02-08 NOTE — ED PROVIDER NOTES
EMERGENCY DEPARTMENT HISTORY AND PHYSICAL EXAM      Date: 2022  Patient Name: Everette Kramer    History of Presenting Illness     Chief Complaint   Patient presents with    Fall       History Provided By: Patient    HPI: Everette Kramer, 57 y.o. female with a past medical history significant obesity and right rotator cuff surgery, hypertension, hypercholesterolemia presents to the ED with cc of bilateral knee pain, right wrist pain, and right shoulder pain status post ground-level fall. 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 She denies any head injury, any other pain or injury, no LOC, no dizziness or any other mechanism contributing to the fall other than the IV tubing. She was sent for evaluation by nursing supervisor. She is able to ambulate and bear weight and has full range of motion to all extremities with no evidence of deformity or acute edema or bruising. There are no other complaints, changes, or physical findings at this time. PCP: Deepthi Garcia MD    No current facility-administered medications on file prior to encounter. No current outpatient medications on file prior to encounter. Past History     Past Medical History:  Past Medical History:   Diagnosis Date    Depression     High cholesterol     HTN (hypertension)        Past Surgical History:  Past Surgical History:   Procedure Laterality Date    HX  SECTION      HX ROTATOR CUFF REPAIR Right        Family History:  History reviewed. No pertinent family history.     Social History:  Social History     Tobacco Use    Smoking status: Never Smoker    Smokeless tobacco: Never Used   Substance Use Topics    Alcohol use: Not Currently    Drug use: Never       Allergies:  No Known Allergies      Review of Systems     Review of Systems   Constitutional: Negative. Negative for appetite change, fatigue and fever. HENT: Negative. Negative for congestion and sore throat. Eyes: Negative. Negative for visual disturbance. Respiratory: Negative. Negative for cough, chest tightness and shortness of breath. Cardiovascular: Negative. Negative for chest pain and leg swelling. Gastrointestinal: Negative. Negative for abdominal pain, diarrhea, nausea and vomiting. Endocrine: Negative. Genitourinary: Negative. Negative for dysuria and flank pain. Musculoskeletal: Positive for arthralgias. Negative for back pain, gait problem, joint swelling, myalgias, neck pain and neck stiffness. Skin: Negative. Allergic/Immunologic: Negative. Neurological: Negative for dizziness, tremors, syncope, weakness, light-headedness, numbness and headaches. Hematological: Negative. Psychiatric/Behavioral: Negative. Negative for confusion. All other systems reviewed and are negative. Physical Exam     Physical Exam  Vitals and nursing note reviewed. Constitutional:       General: She is not in acute distress. Appearance: Normal appearance. She is obese. She is not ill-appearing or diaphoretic. HENT:      Head: Normocephalic and atraumatic. Right Ear: External ear normal.      Left Ear: External ear normal.      Nose: Nose normal.      Mouth/Throat:      Pharynx: Oropharynx is clear. Eyes:      General: No scleral icterus. Conjunctiva/sclera: Conjunctivae normal.      Pupils: Pupils are equal, round, and reactive to light. Cardiovascular:      Rate and Rhythm: Normal rate and regular rhythm. Pulses: Normal pulses. Heart sounds: Normal heart sounds. Pulmonary:      Effort: Pulmonary effort is normal.      Breath sounds: Normal breath sounds. Chest:      Chest wall: No tenderness.    Abdominal:      General: Bowel sounds are normal.      Palpations: Abdomen is soft. Tenderness: There is no abdominal tenderness. Musculoskeletal:      Right shoulder: Tenderness and bony tenderness present. No swelling, deformity or crepitus. Normal range of motion. Normal strength. Normal pulse. Left shoulder: Normal.      Right upper arm: Normal.      Left upper arm: Normal.      Right elbow: Normal.      Left elbow: Normal.      Right forearm: Normal.      Left forearm: Normal.      Right wrist: Tenderness present. No swelling, deformity, bony tenderness, snuff box tenderness or crepitus. Normal range of motion. Normal pulse. Left wrist: Normal.      Right hand: Normal.      Left hand: Normal.      Cervical back: Normal range of motion and neck supple. Right hip: Normal.      Left hip: Normal.      Right upper leg: Normal.      Left upper leg: Normal.      Right knee: Bony tenderness present. No swelling, effusion, erythema, ecchymosis or crepitus. Normal range of motion. Normal alignment and normal patellar mobility. Left knee: Bony tenderness present. No swelling, effusion, erythema, ecchymosis or crepitus. Normal range of motion. Normal alignment and normal patellar mobility. Right lower leg: Normal.      Left lower leg: Normal.      Right ankle: Normal.      Left ankle: Normal.   Skin:     General: Skin is warm and dry. Capillary Refill: Capillary refill takes less than 2 seconds. Findings: Rash present. No abrasion, bruising, erythema, signs of injury or wound. Rash is crusting. Neurological:      General: No focal deficit present. Mental Status: She is alert and oriented to person, place, and time. Cranial Nerves: Cranial nerves are intact. Sensory: Sensation is intact. No sensory deficit. Motor: Motor function is intact. No tremor, abnormal muscle tone or pronator drift. Coordination: Coordination is intact. Gait: Gait is intact.    Psychiatric:         Mood and Affect: Mood normal. Behavior: Behavior normal.         Lab and Diagnostic Study Results     Labs -   No results found for this or any previous visit (from the past 12 hour(s)). Radiologic Studies -   @lastxrresult@  CT Results  (Last 48 hours)    None        CXR Results  (Last 48 hours)    None            Medical Decision Making   - I am the first provider for this patient. - I reviewed the vital signs, available nursing notes, past medical history, past surgical history, family history and social history. - Initial assessment performed. The patients presenting problems have been discussed, and they are in agreement with the care plan formulated and outlined with them. I have encouraged them to ask questions as they arise throughout their visit. Vital Signs-Reviewed the patient's vital signs. Patient Vitals for the past 12 hrs:   Temp Pulse Resp BP SpO2   02/07/22 2146 97.9 °F (36.6 °C) 66 20 (!) 156/80 96 %       Records Reviewed: Nursing Notes, Old Medical Records and Previous Radiology Studies    The patient presents with level fall, bilateral knee pain, shoulder pain, right wrist pain with a differential diagnosis of contusion, fracture, dislocation, ligament injury, tendon injury, damage to previous orthopedic surgery      ED Course:   Patient was seen and evaluated. Full range of motion active, without evidence of acute pain. No dislocation, deformity, ecchymosis or contusion seen on initial exam.  Patient reexamined prior to disposition and no development of any additional symptoms, areas of pain or worsening of her initial complaints. Her pain improved with ice pack and 1 dose of oral Toradol. Imaging negative for any acute fracture, dislocation, effusion or abnormality. She was agreeable with following up with Workmen's Compensation for further evaluation and for recommendations for return to work.   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 She has understanding and knowledge of signs and symptoms to return for and will return to the emergency department if needed arise or worsening of symptoms prior to ability to follow-up with Workmen's Comp. if necessary. She is able to bear weight and ambulate with steady gait with no change in range of motion from initial examination       Provider Notes (Medical Decision Making):     MDM  Number of Diagnoses or Management Options  Encounter related to worker's compensation claim: new, needed workup  Fall, initial encounter: new, needed workup  Pain in both knees, unspecified chronicity: new, needed workup  Right shoulder pain, unspecified chronicity: new, needed workup  Right wrist pain: new, needed workup     Amount and/or Complexity of Data Reviewed  Tests in the radiology section of CPT®: ordered and reviewed  Discuss the patient with other providers: yes  Independent visualization of images, tracings, or specimens: yes    Risk of Complications, Morbidity, and/or Mortality  Presenting problems: low  Diagnostic procedures: minimal  Management options: minimal    Patient Progress  Patient progress: improved           Disposition   Disposition: Condition stable and improved  DC- Adult Discharges: All of the diagnostic tests were reviewed and questions answered. Diagnosis, care plan and treatment options were discussed. The patient understands the instructions and will follow up as directed. The patients results have been reviewed with them. They have been counseled regarding their diagnosis. The patient verbally convey understanding and agreement of the signs, symptoms, diagnosis, treatment and prognosis and additionally agrees to follow up as recommended with their PCP in 24 - 48 hours. They also agree with the care-plan and convey that all of their questions have been answered.   I have also put together some discharge instructions for them that include: 1) educational information regarding their diagnosis, 2) how to care for their diagnosis at home, as well a 3) list of reasons why they would want to return to the ED prior to their follow-up appointment, should their condition change. Discharged    DISCHARGE PLAN:  1. There are no discharge medications for this patient. 2.   Follow-up Information     Follow up With Specialties Details Why Contact Info    Ekaterina Corral MD Family Medicine Call  For PCP follow up or as directed by [address]  [phone]      Orthopedic provider or as directed by [facility] Compensation for follow up  Call       Follow up with primary care, urgent care, or this Emergency department   As needed, If symptoms worsen         3. Return to ED if worse   4. Discharge Medication List as of 2/7/2022 11:29 PM            Diagnosis     Clinical Impression:   1. Fall, initial encounter    2. Right shoulder pain, unspecified chronicity    3. Right wrist pain    4. Pain in both knees, unspecified chronicity    5. Encounter related to worker's compensation claim        Attestations:    Sandro Stiles NP    Please note that this dictation was completed with [employer], the [employer] voice recognition software. Quite often unanticipated grammatical, syntax, homophones, and other interpretive errors are inadvertently transcribed by the computer software. Please disregard these errors. Please excuse any errors that have escaped final proofreading. Thank you.

## 2022-02-08 NOTE — ED TRIAGE NOTES
Pt reports tripped over IV tubing, did not strike head pain to right shoulder elbow and bilateral knees.

## 2022-02-08 NOTE — TELEPHONE ENCOUNTER
Location of employment: [employer]    Location of injury (body part involved):   Right shoulder, right wrist and hand and both knees. Time of injury: 2055 2/7/2022    Last 4 of N: 9254    Subjective: Caller states \"I was turning to walk away from patient and my foot got caught up in the patient line, I tried to shake my leg out and I tripped about 10 feet and fell on both knees and hit draws and countertop, I hit my right shoulder and right palm and wrist.\"     Current Symptoms: Right shoulder pain and right wrist and right hand    Pain Severity: 4/10; tingling; constant    Temperature: patient denies n/a    What has been tried: Took tylenol and naproxen around 1845. LMP: NA Pregnant: NA    Recommended disposition: See PCP within 24 hours. Care advice provided, patient verbalizes understanding; denies any other questions or concerns; instructed to call back for any new or worsening symptoms. Patient proceeding to [facility] Emergency Department      Reason for Disposition   [1] High-risk adult (e.g., age > 61 years, osteoporosis, chronic steroid use) AND [2] still hurts   [1] High-risk adult (e.g., age > 61 years, osteoporosis, chronic steroid use) AND [2] still hurts    Answer Assessment - Initial Assessment Questions  1. MECHANISM: \"How did the injury happen? \"      I was turning to walk away from patient and my foot got caught up in the patient line, I tried to shake my leg out and I tripped about 10 feet and fell on both knees and hit draws and countertop, I hit my right shoulder and right palm and wrist.\"     2. ONSET: \"When did the injury happen? \" (Minutes or hours ago)       2055    3. APPEARANCE of INJURY: \"What does the injury look like? \"       Patient states shoulder looks normal    4. SEVERITY: \"Can you move the shoulder normally? \"       I can move it normally but it is painful    5. SIZE: For cuts, bruises, or swelling, ask: \"How large is it? \" (e.g., inches or centimeters;  entire joint)       Denies    6. PAIN: \"Is there pain? \" If Yes, ask: \"How bad is the pain? \"   (e.g., Scale 1-10; or mild, moderate, severe)      Yes 4/10     7. TETANUS: For any breaks in the skin, ask: \"When was the last tetanus booster? \"      N/a    8. OTHER SYMPTOMS: \"Do you have any other symptoms? \" (e.g., loss of sensation)      Tingling in shoulder and tingling in hand and wrist is a little numb. 9. PREGNANCY: \"Is there any chance you are pregnant? \" \"When was your last menstrual period? \"      n/a    Answer Assessment - Initial Assessment Questions  1. MECHANISM: \"How did the injury happen? \"      \"I was turning to walk away from patient and my foot got caught up in the patient line, I tried to shake my leg out and I tripped about 10 feet and fell on both knees and hit draws and countertop, I hit my right shoulder and right palm and wrist.\"     2. ONSET: \"When did the injury happen? \" (Minutes or hours ago)       2055    3. APPEARANCE of INJURY: \"What does the injury look like? \"       Looks normal    4. SEVERITY: \"Can you use the hand normally? \" \"Can you bend your fingers into a ball and then fully open them? \"      Yes can use normally    5. SIZE: For cuts, bruises, or swelling, ask: \"How large is it? \" (e.g., inches or centimeters;  entire hand or wrist)       Patient denies    6. PAIN: \"Is there pain? \" If Yes, ask: \"How bad is the pain? \"  (Scale 1-10; or mild, moderate, severe)      Yes 3/10    7. TETANUS: For any breaks in the skin, ask: \"When was the last tetanus booster? \"      N/a    8. OTHER SYMPTOMS: \"Do you have any other symptoms? \"       Numbness in wrist    9. PREGNANCY: \"Is there any chance you are pregnant? \" \"When was your last menstrual period? \"      n/a    Protocols used: SHOULDER INJURY-ADULT-, HAND AND WRIST INJURY-ADULT-

## 2022-08-24 NOTE — THERAPY DISCHARGE
[address]  [place], [place]  Ph: [phone]     Fax: [phone]    Discharge Summary 2-15    Patient name: Sulma Sen  : 1960  Provider#: [id number]  Referral source: Griffin Enriquez MD      Medical/Treatment Diagnosis: Low back pain [M54.5]     Prior Hospitalization: see medical history     Comorbidities: See Plan of Care  Prior Level of Function: See Plan of Care  Medications: Verified on Patient Summary List    Start of Care: 2021      Onset Date:   Visits from Start of Care: 9   Missed Visits: 2  Reporting Period : 2021 to 2021    Assessment / Summary of care:   Pt demonstrated good progress with therapy, was reporting no pain at last visit and had met all goals. Progress Toward Goals:  Short Term Goals: To be accomplished in 6 treatments. 1)  Pt to be independent with HEP - MET   2)  Pt to improve lumbar flexion by 5 degrees in order to demonstrate improved facet joint function and no pain with walking beyond 3/10. MET     Long Term Goals: To be accomplished in 12  treatments.   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

## 2022-09-28 ENCOUNTER — APPOINTMENT (OUTPATIENT)
Dept: CT IMAGING | Age: 62
End: 2022-09-28
Attending: EMERGENCY MEDICINE
Payer: COMMERCIAL

## 2022-09-28 ENCOUNTER — HOSPITAL ENCOUNTER (EMERGENCY)
Age: 62
Discharge: HOME | End: 2022-09-28
Attending: EMERGENCY MEDICINE
Payer: COMMERCIAL

## 2022-09-28 VITALS
BODY MASS INDEX: 35 KG/M2 | SYSTOLIC BLOOD PRESSURE: 118 MMHG | RESPIRATION RATE: 18 BRPM | WEIGHT: 205 LBS | DIASTOLIC BLOOD PRESSURE: 70 MMHG | HEART RATE: 70 BPM | OXYGEN SATURATION: 98 % | HEIGHT: 64 IN | TEMPERATURE: 98.3 F

## 2022-09-28 DIAGNOSIS — N12 PYELONEPHRITIS: Primary | ICD-10-CM

## 2022-09-28 LAB
ALBUMIN SERPL-MCNC: 3.1 G/DL (ref 3.5–5)
ALBUMIN/GLOB SERPL: 0.7 {RATIO} (ref 1.1–2.2)
ALP SERPL-CCNC: 174 U/L (ref 45–117)
ALT SERPL-CCNC: 27 U/L (ref 12–78)
ANION GAP SERPL CALC-SCNC: 11 MMOL/L (ref 5–15)
APPEARANCE UR: CLEAR
AST SERPL W P-5'-P-CCNC: 20 U/L (ref 15–37)
BACTERIA URNS QL MICRO: ABNORMAL /HPF
BASOPHILS # BLD: 0 K/UL (ref 0–0.1)
BASOPHILS NFR BLD: 0 % (ref 0–1)
BILIRUB SERPL-MCNC: 0.6 MG/DL (ref 0.2–1)
BILIRUB UR QL: NEGATIVE
BUN SERPL-MCNC: 21 MG/DL (ref 6–20)
BUN/CREAT SERPL: 16 (ref 12–20)
CA-I BLD-MCNC: 9.3 MG/DL (ref 8.5–10.1)
CHLORIDE SERPL-SCNC: 99 MMOL/L (ref 97–108)
CO2 SERPL-SCNC: 22 MMOL/L (ref 21–32)
COLOR UR: YELLOW
CREAT SERPL-MCNC: 1.28 MG/DL (ref 0.55–1.02)
DIFFERENTIAL METHOD BLD: ABNORMAL
EOSINOPHIL # BLD: 0 K/UL (ref 0–0.4)
EOSINOPHIL NFR BLD: 0 % (ref 0–7)
EPITH CASTS URNS QL MICRO: ABNORMAL /LPF
ERYTHROCYTE [DISTWIDTH] IN BLOOD BY AUTOMATED COUNT: 15 % (ref 11.5–14.5)
FLUAV RNA SPEC QL NAA+PROBE: NOT DETECTED
FLUBV RNA SPEC QL NAA+PROBE: NOT DETECTED
GLOBULIN SER CALC-MCNC: 4.4 G/DL (ref 2–4)
GLUCOSE SERPL-MCNC: 129 MG/DL (ref 65–100)
GLUCOSE UR STRIP.AUTO-MCNC: NEGATIVE MG/DL
HCT VFR BLD AUTO: 33.8 % (ref 35–47)
HGB BLD-MCNC: 11.3 G/DL (ref 11.5–16)
HGB UR QL STRIP: ABNORMAL
IMM GRANULOCYTES # BLD AUTO: 0 K/UL (ref 0–0.04)
IMM GRANULOCYTES NFR BLD AUTO: 0 % (ref 0–0.5)
KETONES UR QL STRIP.AUTO: 50 MG/DL
LACTATE SERPL-SCNC: 1 MMOL/L (ref 0.4–2)
LEUKOCYTE ESTERASE UR QL STRIP.AUTO: ABNORMAL
LYMPHOCYTES # BLD: 0.4 K/UL (ref 0.8–3.5)
LYMPHOCYTES NFR BLD: 5 % (ref 12–49)
MCH RBC QN AUTO: 27.2 PG (ref 26–34)
MCHC RBC AUTO-ENTMCNC: 33.4 G/DL (ref 30–36.5)
MCV RBC AUTO: 81.3 FL (ref 80–99)
MONOCYTES # BLD: 1.1 K/UL (ref 0–1)
MONOCYTES NFR BLD: 12 % (ref 5–13)
NEUTS SEG # BLD: 7.3 K/UL (ref 1.8–8)
NEUTS SEG NFR BLD: 83 % (ref 32–75)
NITRITE UR QL STRIP.AUTO: NEGATIVE
PH UR STRIP: 5 [PH] (ref 5–8)
PLATELET # BLD AUTO: 182 K/UL (ref 150–400)
PMV BLD AUTO: 9.7 FL (ref 8.9–12.9)
POTASSIUM SERPL-SCNC: 3.6 MMOL/L (ref 3.5–5.1)
PROT SERPL-MCNC: 7.5 G/DL (ref 6.4–8.2)
PROT UR STRIP-MCNC: 30 MG/DL
RBC # BLD AUTO: 4.16 M/UL (ref 3.8–5.2)
RBC #/AREA URNS HPF: ABNORMAL /HPF (ref 0–5)
SARS-COV-2, COV2: NOT DETECTED
SODIUM SERPL-SCNC: 132 MMOL/L (ref 136–145)
SP GR UR REFRACTOMETRY: 1.01 (ref 1–1.03)
UA: UC IF INDICATED,UAUC: ABNORMAL
UROBILINOGEN UR QL STRIP.AUTO: 1 EU/DL (ref 0.2–1)
WBC # BLD AUTO: 8.9 K/UL (ref 3.6–11)
WBC URNS QL MICRO: ABNORMAL /HPF (ref 0–4)

## 2022-09-28 PROCEDURE — 74011000250 HC RX REV CODE- 250: Performed by: EMERGENCY MEDICINE

## 2022-09-28 PROCEDURE — 81001 URINALYSIS AUTO W/SCOPE: CPT

## 2022-09-28 PROCEDURE — 87636 SARSCOV2 & INF A&B AMP PRB: CPT

## 2022-09-28 PROCEDURE — 83605 ASSAY OF LACTIC ACID: CPT

## 2022-09-28 PROCEDURE — 80053 COMPREHEN METABOLIC PANEL: CPT

## 2022-09-28 PROCEDURE — 74011250636 HC RX REV CODE- 250/636: Performed by: EMERGENCY MEDICINE

## 2022-09-28 PROCEDURE — 85025 COMPLETE CBC W/AUTO DIFF WBC: CPT

## 2022-09-28 PROCEDURE — 74011000636 HC RX REV CODE- 636: Performed by: EMERGENCY MEDICINE

## 2022-09-28 PROCEDURE — 96374 THER/PROPH/DIAG INJ IV PUSH: CPT

## 2022-09-28 PROCEDURE — 96375 TX/PRO/DX INJ NEW DRUG ADDON: CPT

## 2022-09-28 PROCEDURE — 74011250637 HC RX REV CODE- 250/637: Performed by: EMERGENCY MEDICINE

## 2022-09-28 PROCEDURE — 99285 EMERGENCY DEPT VISIT HI MDM: CPT

## 2022-09-28 PROCEDURE — 74178 CT ABD&PLV WO CNTR FLWD CNTR: CPT

## 2022-09-28 RX ORDER — FLUOXETINE HYDROCHLORIDE 20 MG/1
CAPSULE ORAL
COMMUNITY
Start: 2022-08-07

## 2022-09-28 RX ORDER — ATORVASTATIN CALCIUM 40 MG/1
TABLET, FILM COATED ORAL
COMMUNITY
Start: 2022-08-07

## 2022-09-28 RX ORDER — BENAZEPRIL HYDROCHLORIDE 20 MG/1
TABLET ORAL
COMMUNITY
Start: 2022-08-07

## 2022-09-28 RX ORDER — ONDANSETRON 2 MG/ML
4 INJECTION INTRAMUSCULAR; INTRAVENOUS
Status: COMPLETED | OUTPATIENT
Start: 2022-09-28 | End: 2022-09-28

## 2022-09-28 RX ORDER — ONDANSETRON 4 MG/1
4 TABLET, FILM COATED ORAL
Qty: 12 TABLET | Refills: 0 | Status: SHIPPED | OUTPATIENT
Start: 2022-09-28

## 2022-09-28 RX ORDER — CIPROFLOXACIN 500 MG/1
500 TABLET ORAL 2 TIMES DAILY
COMMUNITY
Start: 2022-09-26

## 2022-09-28 RX ORDER — ACETAMINOPHEN 325 MG/1
650 TABLET ORAL
Status: COMPLETED | OUTPATIENT
Start: 2022-09-28 | End: 2022-09-28

## 2022-09-28 RX ADMIN — ONDANSETRON 4 MG: 2 INJECTION INTRAMUSCULAR; INTRAVENOUS at 11:46

## 2022-09-28 RX ADMIN — IOPAMIDOL 100 ML: 755 INJECTION, SOLUTION INTRAVENOUS at 12:49

## 2022-09-28 RX ADMIN — CEFTRIAXONE SODIUM 1 G: 1 INJECTION, POWDER, FOR SOLUTION INTRAMUSCULAR; INTRAVENOUS at 12:58

## 2022-09-28 RX ADMIN — ACETAMINOPHEN 650 MG: 325 TABLET, FILM COATED ORAL at 14:49

## 2022-09-28 NOTE — Clinical Note
Mack Carmona was seen and treated in our emergency department on 9/28/2022. Mrs Dallin Chand will return to work when cleared by her PCP.     Morena Gerardo MD

## 2022-09-28 NOTE — ED TRIAGE NOTES
Patient reports has had symptoms of a UTI for about a week, seen by pcp and placed on cipro, has been having increased symptoms, flank pain, back pain, nausea, pcp advised to go to ER.

## 2022-09-28 NOTE — DISCHARGE INSTRUCTIONS
Thank you! Thank you for allowing me to care for you in the emergency department. It is my goal to provide you with excellent care. If you have not received excellent quality care, please ask to speak to the nurse manager. Please fill out the survey that will come to you by mail or email since we listen to your feedback! Below you will find a list of your tests from today's visit. Should you have any questions, please do not hesitate to call the emergency department. Labs  Recent Results (from the past 12 hour(s))   URINALYSIS W/ REFLEX CULTURE    Collection Time: 09/28/22 11:40 AM    Specimen: Urine   Result Value Ref Range    Color Yellow      Appearance Clear Clear      Specific gravity 1.015 1.003 - 1.030      pH (UA) 5.0 5.0 - 8.0      Protein 30 (A) Negative mg/dL    Glucose Negative Negative mg/dL    Ketone 50 (A) Negative mg/dL    Bilirubin Negative Negative      Blood Large (A) Negative      Urobilinogen 1.0 0.2 - 1.0 EU/dL    Nitrites Negative Negative      Leukocyte Esterase Large (A) Negative      WBC 0-4 0 - 4 /hpf    RBC 0-5 0 - 5 /hpf    Epithelial cells Many (A) Few /lpf    Bacteria 1+ (A) Negative /hpf    UA:UC IF INDICATED Culture not indicated by UA result Culture not indicated by UA result     CBC WITH AUTOMATED DIFF    Collection Time: 09/28/22 11:40 AM   Result Value Ref Range    WBC 8.9 3.6 - 11.0 K/uL    RBC 4.16 3.80 - 5.20 M/uL    HGB 11.3 (L) 11.5 - 16.0 g/dL    HCT 33.8 (L) 35.0 - 47.0 %    MCV 81.3 80.0 - 99.0 FL    MCH 27.2 26.0 - 34.0 PG    MCHC 33.4 30.0 - 36.5 g/dL    RDW 15.0 (H) 11.5 - 14.5 %    PLATELET 650 691 - 877 K/uL    MPV 9.7 8.9 - 12.9 FL    NEUTROPHILS 83 (H) 32 - 75 %    LYMPHOCYTES 5 (L) 12 - 49 %    MONOCYTES 12 5 - 13 %    EOSINOPHILS 0 0 - 7 %    BASOPHILS 0 0 - 1 %    IMMATURE GRANULOCYTES 0 0.0 - 0.5 %    ABS. NEUTROPHILS 7.3 1.8 - 8.0 K/UL    ABS. LYMPHOCYTES 0.4 (L) 0.8 - 3.5 K/UL    ABS. MONOCYTES 1.1 (H) 0.0 - 1.0 K/UL    ABS.  EOSINOPHILS 0.0 0.0 - 0.4 K/UL    ABS. BASOPHILS 0.0 0.0 - 0.1 K/UL    ABS. IMM. GRANS. 0.0 0.00 - 0.04 K/UL    DF AUTOMATED     METABOLIC PANEL, COMPREHENSIVE    Collection Time: 09/28/22 11:40 AM   Result Value Ref Range    Sodium 132 (L) 136 - 145 mmol/L    Potassium 3.6 3.5 - 5.1 mmol/L    Chloride 99 97 - 108 mmol/L    CO2 22 21 - 32 mmol/L    Anion gap 11 5 - 15 mmol/L    Glucose 129 (H) 65 - 100 mg/dL    BUN 21 (H) 6 - 20 mg/dL    Creatinine 1.28 (H) 0.55 - 1.02 mg/dL    BUN/Creatinine ratio 16 12 - 20      GFR est AA 51 (L) >60 ml/min/1.73m2    GFR est non-AA 42 (L) >60 ml/min/1.73m2    Calcium 9.3 8.5 - 10.1 mg/dL    Bilirubin, total 0.6 0.2 - 1.0 mg/dL    AST (SGOT) 20 15 - 37 U/L    ALT (SGPT) 27 12 - 78 U/L    Alk. phosphatase 174 (H) 45 - 117 U/L    Protein, total 7.5 6.4 - 8.2 g/dL    Albumin 3.1 (L) 3.5 - 5.0 g/dL    Globulin 4.4 (H) 2.0 - 4.0 g/dL    A-G Ratio 0.7 (L) 1.1 - 2.2     LACTIC ACID    Collection Time: 09/28/22 11:40 AM   Result Value Ref Range    Lactic acid 1.0 0.4 - 2.0 mmol/L   COVID-19 WITH INFLUENZA A/B    Collection Time: 09/28/22  1:17 PM   Result Value Ref Range    SARS-CoV-2 by PCR Not Detected Not Detected      Influenza A by PCR Not Detected Not Detected      Influenza B by PCR Not Detected Not Detected         Radiologic Studies  CT ABD PELV W WO CONT   Final Result      1. Acute LEFT kidney pyelonephritis. There are mild changes of the RIGHT kidney   concerning for early pyelonephritis. 2. Gallstones. No acute cholecystitis. CT Results  (Last 48 hours)                 09/28/22 1252  CT ABD PELV W WO CONT Final result    Impression:      1. Acute LEFT kidney pyelonephritis. There are mild changes of the RIGHT kidney   concerning for early pyelonephritis. 2. Gallstones. No acute cholecystitis. Narrative:  EXAM: CT ABD PELV W WO CONT       INDICATION: recent UTI, feeing worse, eval for pyelo       COMPARISON: None        CONTRAST: 100 mL of Isovue-370.        ORAL CONTRAST: None       TECHNIQUE:    Following the uneventful intravenous administration of contrast, thin axial   images were obtained through the abdomen and pelvis. Coronal and sagittal   reconstructions were generated. CT dose reduction was achieved through use of a   standardized protocol tailored for this examination and automatic exposure   control for dose modulation. FINDINGS:    LOWER THORAX: No significant abnormality in the incidentally imaged lower chest.   LIVER: No mass. BILIARY TREE: Gallbladder is normally distended. There are multiple calcified   stones. No evidence of acute cholecystitis. CBD is not dilated. SPLEEN: within normal limits. PANCREAS: No mass or ductal dilatation. ADRENALS: Unremarkable. KIDNEYS: The LEFT kidney is asymmetrically larger. LEFT kidney demonstrates   heterogeneous enhancement with peripheral hypodensities. There are also   peripheral wedge-shaped hypodensities of the RIGHT kidney. No complex cystic or   solid renal mass is seen. There is no evidence of renal stone. STOMACH: There is a small sliding-type hiatal hernia. Algis Broaden SMALL BOWEL: No dilatation or wall thickening. COLON: No dilatation or wall thickening. APPENDIX: Normal   PERITONEUM: No ascites or pneumoperitoneum. RETROPERITONEUM: Mild left-sided perinephric stranding. No fluid collection or   abscess. REPRODUCTIVE ORGANS: No adnexal mass. URINARY BLADDER: No mass or calculus. BONES: No destructive bone lesion. ABDOMINAL WALL: No mass or hernia. ADDITIONAL COMMENTS: N/A                 CXR Results  (Last 48 hours)      None          ------------------------------------------------------------------------------------------------------------  The exam and treatment you received in the Emergency Department were for an urgent problem and are not intended as complete care.  It is important that you follow-up with a doctor, nurse practitioner, or physician assistant to:  (1) confirm your diagnosis, (2) re-evaluation of changes in your illness and treatment, and  (3) for ongoing care. Please take your discharge instructions with you when you go to your follow-up appointment. If you have any problem arranging a follow-up appointment, contact the Emergency Department. If your symptoms become worse or you do not improve as expected and you are unable to reach your health care provider, please return to the Emergency Department. We are available 24 hours a day. If a prescription has been provided, please have it filled as soon as possible to prevent a delay in treatment. If you have any questions or reservations about taking the medication due to side effects or interactions with other medications, please call your primary care provider or contact the ER.

## 2022-09-28 NOTE — ED PROVIDER NOTES
EMERGENCY DEPARTMENT HISTORY AND PHYSICAL EXAM      Date: 2022  Patient Name: Madelyne Holter    History of Presenting Illness     Chief Complaint   Patient presents with    UTI       History Provided By: Patient    HPI: Madelyne Holter, 57 y.o. female with HTN presents to ED complaining of urinary tract symptoms, malaise, nausea and abd pain since Friday. Initially, she justt didn't feel well without specific complaints. Over the weekend she was at a 10 noticed decreased appetite, and increased urinary frequency. When she returned on  she took 2 Cipro over the day that friend had had from prior infection. She then followed up with her PCP on Monday. He performed labs UA and culture. She was treated as if she had potential Pyelo. She noticed her symptoms of not improved in fact worsening nausea when she takes a Cipro. She was sent to the ED. Denies fevers, chills. Is COVID vaccinated and boosted. There are no other complaints, changes, or physical findings at this time. PCP: Erika Pretty MD    No current facility-administered medications on file prior to encounter. Current Outpatient Medications on File Prior to Encounter   Medication Sig Dispense Refill    ciprofloxacin HCl (CIPRO) 500 mg tablet Take 500 mg by mouth two (2) times a day. benazepriL (LOTENSIN) 20 mg tablet       atorvastatin (LIPITOR) 40 mg tablet       FLUoxetine (PROzac) 20 mg capsule          Past History     Past Medical History:  Past Medical History:   Diagnosis Date    Depression     High cholesterol     HTN (hypertension)        Past Surgical History:  Past Surgical History:   Procedure Laterality Date    HX  SECTION      HX ROTATOR CUFF REPAIR Right        Family History:  History reviewed. No pertinent family history.     Social History:  Social History     Tobacco Use    Smoking status: Never    Smokeless tobacco: Never   Substance Use Topics    Alcohol use: Not Currently    Drug use: Never Allergies:  No Known Allergies    Review of Systems   Review of Systems   Constitutional:  Positive for activity change, appetite change and fatigue. HENT: Negative. Respiratory: Negative. Cardiovascular: Negative. Gastrointestinal:  Positive for abdominal pain and nausea. Genitourinary:  Positive for frequency. Musculoskeletal:  Positive for back pain and myalgias. Skin: Negative. Neurological: Negative. Psychiatric/Behavioral: Negative. All other systems reviewed and are negative. Physical Exam   Physical Exam  Vitals and nursing note reviewed. Constitutional:       General: She is not in acute distress. HENT:      Head: Atraumatic. Eyes:      Conjunctiva/sclera: Conjunctivae normal.      Pupils: Pupils are equal, round, and reactive to light. Cardiovascular:      Rate and Rhythm: Normal rate and regular rhythm. Heart sounds: Normal heart sounds. No murmur heard. Pulmonary:      Effort: Pulmonary effort is normal. No respiratory distress. Breath sounds: Normal breath sounds. No wheezing or rales. Chest:      Chest wall: No tenderness. Abdominal:      General: Bowel sounds are normal. There is no distension. Palpations: Abdomen is soft. There is no mass. Tenderness: There is abdominal tenderness. There is no right CVA tenderness, left CVA tenderness, guarding or rebound. Comments: Mild abdominal tenderness no significant focality to exam   Musculoskeletal:         General: Normal range of motion. Cervical back: Normal range of motion. Skin:     General: Skin is warm. Findings: No erythema or rash. Neurological:      Mental Status: She is alert and oriented to person, place, and time. Cranial Nerves: No cranial nerve deficit.        Lab and Diagnostic Study Results   Labs -     Recent Results (from the past 12 hour(s))   URINALYSIS W/ REFLEX CULTURE    Collection Time: 09/28/22 11:40 AM    Specimen: Urine   Result Value Ref Range    Color Yellow      Appearance Clear Clear      Specific gravity 1.015 1.003 - 1.030      pH (UA) 5.0 5.0 - 8.0      Protein 30 (A) Negative mg/dL    Glucose Negative Negative mg/dL    Ketone 50 (A) Negative mg/dL    Bilirubin Negative Negative      Blood Large (A) Negative      Urobilinogen 1.0 0.2 - 1.0 EU/dL    Nitrites Negative Negative      Leukocyte Esterase Large (A) Negative      WBC 0-4 0 - 4 /hpf    RBC 0-5 0 - 5 /hpf    Epithelial cells Many (A) Few /lpf    Bacteria 1+ (A) Negative /hpf    UA:UC IF INDICATED Culture not indicated by UA result Culture not indicated by UA result     CBC WITH AUTOMATED DIFF    Collection Time: 09/28/22 11:40 AM   Result Value Ref Range    WBC 8.9 3.6 - 11.0 K/uL    RBC 4.16 3.80 - 5.20 M/uL    HGB 11.3 (L) 11.5 - 16.0 g/dL    HCT 33.8 (L) 35.0 - 47.0 %    MCV 81.3 80.0 - 99.0 FL    MCH 27.2 26.0 - 34.0 PG    MCHC 33.4 30.0 - 36.5 g/dL    RDW 15.0 (H) 11.5 - 14.5 %    PLATELET 230 102 - 898 K/uL    MPV 9.7 8.9 - 12.9 FL    NEUTROPHILS 83 (H) 32 - 75 %    LYMPHOCYTES 5 (L) 12 - 49 %    MONOCYTES 12 5 - 13 %    EOSINOPHILS 0 0 - 7 %    BASOPHILS 0 0 - 1 %    IMMATURE GRANULOCYTES 0 0.0 - 0.5 %    ABS. NEUTROPHILS 7.3 1.8 - 8.0 K/UL    ABS. LYMPHOCYTES 0.4 (L) 0.8 - 3.5 K/UL    ABS. MONOCYTES 1.1 (H) 0.0 - 1.0 K/UL    ABS. EOSINOPHILS 0.0 0.0 - 0.4 K/UL    ABS. BASOPHILS 0.0 0.0 - 0.1 K/UL    ABS. IMM.  GRANS. 0.0 0.00 - 0.04 K/UL    DF AUTOMATED     METABOLIC PANEL, COMPREHENSIVE    Collection Time: 09/28/22 11:40 AM   Result Value Ref Range    Sodium 132 (L) 136 - 145 mmol/L    Potassium 3.6 3.5 - 5.1 mmol/L    Chloride 99 97 - 108 mmol/L    CO2 22 21 - 32 mmol/L    Anion gap 11 5 - 15 mmol/L    Glucose 129 (H) 65 - 100 mg/dL    BUN 21 (H) 6 - 20 mg/dL    Creatinine 1.28 (H) 0.55 - 1.02 mg/dL    BUN/Creatinine ratio 16 12 - 20      GFR est AA 51 (L) >60 ml/min/1.73m2    GFR est non-AA 42 (L) >60 ml/min/1.73m2    Calcium 9.3 8.5 - 10.1 mg/dL    Bilirubin, total 0.6 0.2 - 1.0 mg/dL    AST (SGOT) 20 15 - 37 U/L    ALT (SGPT) 27 12 - 78 U/L    Alk. phosphatase 174 (H) 45 - 117 U/L    Protein, total 7.5 6.4 - 8.2 g/dL    Albumin 3.1 (L) 3.5 - 5.0 g/dL    Globulin 4.4 (H) 2.0 - 4.0 g/dL    A-G Ratio 0.7 (L) 1.1 - 2.2     LACTIC ACID    Collection Time: 09/28/22 11:40 AM   Result Value Ref Range    Lactic acid 1.0 0.4 - 2.0 mmol/L       Radiologic Studies -   @[email or web address]@  CT Results  (Last 48 hours)      None          CXR Results  (Last 48 hours)      None            Medical Decision Making and ED Course   Differential Diagnosis & Medical Decision Making Provider Note:   UTI, Pyelo, sepsis, dehydration, electrolyte abnormality, patient intolerance, viral syndrome,     - I am the first provider for this patient. I reviewed the vital signs, available nursing notes, past medical history, past surgical history, family history and social history. The patients presenting problems have been discussed, and they are in agreement with the care plan formulated and outlined with them. I have encouraged them to ask questions as they arise throughout their visit. Vital Signs-Reviewed the patient's vital signs. Patient Vitals for the past 12 hrs:   Temp Pulse Resp BP SpO2   09/28/22 1300 -- 79 20 121/71 98 %   09/28/22 1123 98.3 °F (36.8 °C) 93 18 138/60 98 %       ED Course:   ED Course as of 10/11/22 2306   Wed Sep 28, 2022   1357 Patient reassessment shows improvement in her nausea and pain. However she is having a mild headache. Main complaint right now is just overall fatigue and not feeling well. She is awaiting on her CAT scan results. We discussed the possibility of admission versus discharge. She is requesting Tylenol which will be provided. [LG]      ED Course User Index  [LG] Bobbi Aguilar MD     Progress note: Patient's nausea has improved since antiemetic provided in ED pain is under control. Pt would like to go home.  We will work with PCP to determine if possible to obain outpatient IV antibiotics the next two days to avoid admission for Pyelo. Progress note:  SPoke with PCP office who was wonderful and agreed to facilitate outpt treatment for pt so she could go home. Pt endorsed understanding and am confident with her compliance. She was also given return precautions and will be monitored by her PCP too. Procedures   Performed by: Anisa Agudelo MD  Procedures      Disposition   Disposition: Condition improved  DC- Adult Discharges: All of the diagnostic tests were reviewed and questions answered. Diagnosis, care plan and treatment options were discussed. The patient understands the instructions and will follow up as directed. The patients results have been reviewed with them. They have been counseled regarding their diagnosis. The patient verbally convey understanding and agreement of the signs, symptoms, diagnosis, treatment and prognosis and additionally agrees to follow up as recommended with their PCP in 24 - 48 hours. They also agree with the care-plan and convey that all of their questions have been answered. I have also put together some discharge instructions for them that include: 1) educational information regarding their diagnosis, 2) how to care for their diagnosis at home, as well a 3) list of reasons why they would want to return to the ED prior to their follow-up appointment, should their condition change. DISCHARGE PLAN:  1. There are no discharge medications for this patient.     2.   Follow-up Information       Follow up With Specialties Details Why Contact Almas Waller MD Family Medicine  Go to office tomorrow and the next day for Ceftriaxone for Pyelo unless told otherwise by BELEN Snyder [phone]  [phone]          PER OUR CONVERSATION, AND OUR DISCUSSION WITH YOUR PCP'S OFFICE (BELEN GREGORY), WE AGREED TO ADMINISTER CEFTRIAXONE THE NEXT TWO DAYS (OR AS DETERMINED BY YOUR DOCTOR) AS OUT PATIENT FOR YOUR PYELONEPHRITIS. IF YOUR SYMPTOMS WORSEN, RETURN TO THE ED. [address] [facility]  As needed, If symptoms worsen- NAUSEA AND VOMITING, FEVERS, WORSENING PAIN [address] [facility] [id number] [phone]          3. Return to ED if worse   4. Current Discharge Medication List          Diagnosis/Clinical Impression     Clinical Impression:   1. Pyelonephritis        Attestations: Sera Mosqueda MD, am the primary clinician of record. Please note that this dictation was completed with [employer], the computer voice recognition software. Quite often unanticipated grammatical, syntax, homophones, and other interpretive errors are inadvertently transcribed by the computer software. Please disregard these errors. Please excuse any errors that have escaped final proofreading. Thank you.

## 2023-04-29 ENCOUNTER — HOSPITAL ENCOUNTER (EMERGENCY)
Age: 63
Discharge: HOME | End: 2023-04-29
Attending: EMERGENCY MEDICINE
Payer: OTHER MISCELLANEOUS

## 2023-04-29 VITALS
OXYGEN SATURATION: 98 % | HEART RATE: 82 BPM | DIASTOLIC BLOOD PRESSURE: 69 MMHG | SYSTOLIC BLOOD PRESSURE: 136 MMHG | RESPIRATION RATE: 16 BRPM | HEIGHT: 64 IN | BODY MASS INDEX: 34.15 KG/M2 | TEMPERATURE: 97.5 F | WEIGHT: 200 LBS
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
## 2023-04-29 DIAGNOSIS — Z77.21 EXPOSURE TO BLOOD OR BODY FLUID: ICD-10-CM

## 2023-04-29 PROCEDURE — 86706 HEP B SURFACE ANTIBODY: CPT

## 2023-04-29 PROCEDURE — 99284 EMERGENCY DEPT VISIT MOD MDM: CPT

## 2023-04-29 PROCEDURE — 36415 COLL VENOUS BLD VENIPUNCTURE: CPT

## 2023-04-29 PROCEDURE — 90471 IMMUNIZATION ADMIN: CPT

## 2023-04-29 PROCEDURE — 74011250636 HC RX REV CODE- 250/636: Performed by: EMERGENCY MEDICINE

## 2023-04-29 PROCEDURE — 90714 TD VACC NO PRESV 7 YRS+ IM: CPT | Performed by: EMERGENCY MEDICINE

## 2023-04-29 PROCEDURE — 74011250637 HC RX REV CODE- 250/637: Performed by: EMERGENCY MEDICINE

## 2023-04-29 RX ORDER — AMOXICILLIN AND CLAVULANATE POTASSIUM 875; 125 MG/1; MG/1
1 TABLET, FILM COATED ORAL
Status: COMPLETED | OUTPATIENT
Start: 2023-04-29 | End: 2023-04-29

## 2023-04-29 RX ORDER — AMOXICILLIN AND CLAVULANATE POTASSIUM 875; 125 MG/1; MG/1
1 TABLET, FILM COATED ORAL 2 TIMES DAILY
Qty: 20 TABLET | Refills: 0 | Status: SHIPPED | OUTPATIENT
Start: 2023-04-29

## 2023-04-29 RX ADMIN — TETANUS AND DIPHTHERIA TOXOIDS ADSORBED 0.5 ML: 2; 2 INJECTION INTRAMUSCULAR at 21:34

## 2023-04-29 RX ADMIN — AMOXICILLIN AND CLAVULANATE POTASSIUM 1 TABLET: 875; 125 TABLET ORAL at 21:34

## 2023-04-30 LAB
HBV SURFACE AG SER QL: <0.1 INDEX
HBV SURFACE AG SER QL: NEGATIVE
HCV AB SER IA-ACNC: 0.14 INDEX
HCV AB SERPL QL IA: NONREACTIVE
HIV1 P24 AG SERPL QL IA: NONREACTIVE
HIV1+2 AB SERPL QL IA: NONREACTIVE

## 2023-05-02 LAB
HBV SURFACE AB SER QL: NONREACTIVE
HBV SURFACE AB SER-ACNC: 7.9 MIU/ML

## 2023-08-29 ENCOUNTER — OFFICE VISIT (OUTPATIENT)
Age: 63
End: 2023-08-29
Payer: COMMERCIAL

## 2023-08-29 VITALS
BODY MASS INDEX: 36.22 KG/M2 | HEIGHT: 64 IN | DIASTOLIC BLOOD PRESSURE: 74 MMHG | SYSTOLIC BLOOD PRESSURE: 126 MMHG | WEIGHT: 212.13 LBS

## 2023-08-29 DIAGNOSIS — N95.2 VAGINAL ATROPHY: ICD-10-CM

## 2023-08-29 DIAGNOSIS — N95.1 MENOPAUSAL STATE: ICD-10-CM

## 2023-08-29 DIAGNOSIS — Z01.419 GYNECOLOGIC EXAM NORMAL: ICD-10-CM

## 2023-08-29 DIAGNOSIS — Z12.31 SCREENING MAMMOGRAM FOR BREAST CANCER: Primary | ICD-10-CM

## 2023-08-29 DIAGNOSIS — L90.0 LICHEN SCLEROSUS: ICD-10-CM

## 2023-08-29 DIAGNOSIS — Z12.4 PAP SMEAR FOR CERVICAL CANCER SCREENING: ICD-10-CM

## 2023-08-29 PROCEDURE — 99386 PREV VISIT NEW AGE 40-64: CPT | Performed by: OBSTETRICS & GYNECOLOGY

## 2023-08-29 RX ORDER — ESTRADIOL 0.1 MG/G
1 CREAM VAGINAL DAILY
Qty: 1 EACH | Refills: 3 | Status: SHIPPED | OUTPATIENT
Start: 2023-08-29

## 2023-08-29 RX ORDER — CLOBETASOL PROPIONATE 0.5 MG/G
OINTMENT TOPICAL
Qty: 60 G | Refills: 2 | Status: SHIPPED | OUTPATIENT
Start: 2023-08-29

## 2023-09-08 LAB
., LABCORP: ABNORMAL
CYTOLOGIST CVX/VAG CYTO: ABNORMAL
CYTOLOGY CVX/VAG DOC CYTO: ABNORMAL
CYTOLOGY CVX/VAG DOC THIN PREP: ABNORMAL
DX ICD CODE: ABNORMAL
DX ICD CODE: ABNORMAL
HPV I/H RISK 4 DNA CVX QL PROBE+SIG AMP: NEGATIVE
Lab: ABNORMAL
OTHER STN SPEC: ABNORMAL
PATHOLOGIST CVX/VAG CYTO: ABNORMAL
STAT OF ADQ CVX/VAG CYTO-IMP: ABNORMAL

## 2023-12-26 DIAGNOSIS — L90.0 LICHEN SCLEROSUS: ICD-10-CM

## 2023-12-29 RX ORDER — CLOBETASOL PROPIONATE 0.5 MG/G
OINTMENT TOPICAL
Qty: 60 G | Refills: 2 | Status: SHIPPED | OUTPATIENT
Start: 2023-12-29

## 2025-04-21 ENCOUNTER — OFFICE VISIT (OUTPATIENT)
Age: 65
End: 2025-04-21
Payer: MEDICARE

## 2025-04-21 VITALS
HEIGHT: 64 IN | BODY MASS INDEX: 33.7 KG/M2 | WEIGHT: 197.4 LBS | DIASTOLIC BLOOD PRESSURE: 77 MMHG | HEART RATE: 53 BPM | RESPIRATION RATE: 16 BRPM | TEMPERATURE: 97.6 F | OXYGEN SATURATION: 95 % | SYSTOLIC BLOOD PRESSURE: 122 MMHG

## 2025-04-21 DIAGNOSIS — L72.0 EPIDERMAL CYST OF NECK: Primary | ICD-10-CM

## 2025-04-21 PROCEDURE — G8417 CALC BMI ABV UP PARAM F/U: HCPCS | Performed by: SURGERY

## 2025-04-21 PROCEDURE — 1123F ACP DISCUSS/DSCN MKR DOCD: CPT | Performed by: SURGERY

## 2025-04-21 PROCEDURE — 1036F TOBACCO NON-USER: CPT | Performed by: SURGERY

## 2025-04-21 PROCEDURE — G8400 PT W/DXA NO RESULTS DOC: HCPCS | Performed by: SURGERY

## 2025-04-21 PROCEDURE — 99203 OFFICE O/P NEW LOW 30 MIN: CPT | Performed by: SURGERY

## 2025-04-21 PROCEDURE — 1090F PRES/ABSN URINE INCON ASSESS: CPT | Performed by: SURGERY

## 2025-04-21 PROCEDURE — G8427 DOCREV CUR MEDS BY ELIG CLIN: HCPCS | Performed by: SURGERY

## 2025-04-21 PROCEDURE — 3017F COLORECTAL CA SCREEN DOC REV: CPT | Performed by: SURGERY

## 2025-04-21 RX ORDER — RALOXIFENE HYDROCHLORIDE 60 MG/1
TABLET, FILM COATED ORAL
COMMUNITY
Start: 2025-02-10

## 2025-04-21 RX ORDER — ZOLPIDEM TARTRATE 5 MG/1
TABLET ORAL
COMMUNITY
Start: 2025-03-12

## 2025-04-21 ASSESSMENT — PATIENT HEALTH QUESTIONNAIRE - PHQ9
SUM OF ALL RESPONSES TO PHQ QUESTIONS 1-9: 0
SUM OF ALL RESPONSES TO PHQ QUESTIONS 1-9: 0
1. LITTLE INTEREST OR PLEASURE IN DOING THINGS: NOT AT ALL
2. FEELING DOWN, DEPRESSED OR HOPELESS: NOT AT ALL
SUM OF ALL RESPONSES TO PHQ QUESTIONS 1-9: 0
SUM OF ALL RESPONSES TO PHQ QUESTIONS 1-9: 0

## 2025-04-21 NOTE — PROGRESS NOTES
[facility]  Ella Reddy, DO  [address], [place] [address]  [phone]      Patient Name: Laurence Bergman (64 y.o., female)    PCP: Ulysses Sharp MD       History of Present Illness  Patient is a 64 y.o. female who recently had an infected sebaceous cyst of the right neck. She states she was seen by her PCP and was prescribed antibiotics. She was able to express a small amount of drainage from it and the swelling has since subsided. She denies any previous episodes of infection and states the area was previously small. She denies any pain currently and the area is barely noticeable to her.    Past Medical History:   Diagnosis Date    Depression     High cholesterol     HTN (hypertension)        Past Surgical History:   Procedure Laterality Date     SECTION      ROTATOR CUFF REPAIR Right        Family History   Family history unknown: Yes       Social History     Tobacco Use    Smoking status: Never    Smokeless tobacco: Never   Vaping Use    Vaping status: Never Used   Substance Use Topics    Alcohol use: Yes    Drug use: Never       No Known Allergies    Prior to Visit Medications    Medication Sig Taking? Authorizing Provider   raloxifene (EVISTA) 60 MG tablet  Yes ProviderAlec MD   zolpidem (AMBIEN) 5 MG tablet take 1 tablet by mouth at bedtime for sleep Yes ProviderAlec MD   clobetasol (TEMOVATE) 0.05 % ointment APPLY TO AFFECTED AREA(S) TOPICALLY 2 TIMES A DAY Yes Rebecca Ordonez MD   atorvastatin (LIPITOR) 40 MG tablet ceived the following from [facility]: Outside name: atorvastatin (LIPITOR) 40 mg tablet Yes Automatic Reconciliation, Ar   benazepril (LOTENSIN) 20 MG tablet ceived the following from [facility]: Outside name: benazepriL (LOTENSIN) 20 mg tablet Yes Automatic Reconciliation, Ar   FLUoxetine (PROZAC) 20 MG capsule ceived the following from [facility]:

## 2025-04-21 NOTE — PROGRESS NOTES
Identified pt with two pt identifiers (name and ). Reviewed chart in preparation for visit and have obtained necessary documentation.    Laurence Bergman is a 64 y.o. female  Chief Complaint   Patient presents with    New Patient    Cyst     On neck      /77 (BP Site: Left Upper Arm, Patient Position: Sitting, BP Cuff Size: Large Adult)   Pulse 53   Temp 97.6 °F (36.4 °C) (Oral)   Resp 16   Ht 1.626 m (5' 4\")   Wt 89.5 kg (197 lb 6.4 oz)   SpO2 95%   BMI 33.88 kg/m²     1. Have you been to the ER, urgent care clinic since your last visit?  Hospitalized since your last visit?no    2. Have you seen or consulted any other health care providers outside of the [facility] since your last visit?  Include any pap smears or colon screening. no

## 2025-04-25 ASSESSMENT — ENCOUNTER SYMPTOMS
NAUSEA: 0
COUGH: 0
SHORTNESS OF BREATH: 0
BACK PAIN: 0
ABDOMINAL PAIN: 0
VOMITING: 0
SORE THROAT: 0
EYE REDNESS: 0